# Patient Record
Sex: FEMALE | Race: WHITE | NOT HISPANIC OR LATINO | Employment: FULL TIME | URBAN - METROPOLITAN AREA
[De-identification: names, ages, dates, MRNs, and addresses within clinical notes are randomized per-mention and may not be internally consistent; named-entity substitution may affect disease eponyms.]

---

## 2021-01-07 NOTE — H&P (VIEW-ONLY)
Kelsie Wilson  [id number]  1955      Reason for visit: Postmenopausal bleeding, grade 1 endometrial cancer    Consultation:  Patient is being seen at the request of Fiona Berger CNM  PCP: No other listed by patient    History of present illness:  The patient is a 65 y.o. year old female who presents today for treatment and evaluation of the above issues.  She presented with 1 month of postmenopausal spotting.  Ultrasound showed a thickened endometrium at 14 mm.  Endometrial biopsy was performed and consistent with endometrial cancer.    She feels overall well today but is anxious about her diagnosis.  She has had minimal bleeding since her biopsy.  She denies changes in bowel and bladder habits.  She denies pelvic pain.  For new patients, [facility] intake form from 2021 was reviewed and confirmed.    OBGYN History:  She is a G 1 P 1.  She does not use HRT. She does not have a history of abnormal pap smears.      Oncologic History:  Oncology/Hematology History   Endometrial cancer (CMS/HCC)   2021 Initial Diagnosis    Endometrial cancer (CMS/HCC)     2021 Cancer Staged    Staging form: Corpus Uteri - Carcinoma And Carcinosarcoma, AJCC 8th Edition  - Clinical stage from 2021: FIGO Stage I (cT1, cN0, cM0) - Signed by Kell Monaco MD on 2021           Past Medical History:   Diagnosis Date   • Endometrial cancer (CMS/HCC)    • Heartburn    • Kidney infection        Past Surgical History:   Procedure Laterality Date   •  SECTION     • TUBAL ABDOMINAL LIGATION         MEDICATIONS: The current medication list was reviewed with the patient and updated in the EMR this date per the Medical Assistant. Medication dosages and frequencies were confirmed to be accurate.      Allergies:  is allergic to codeine and fish-derived products.    Social History:   Social History     Socioeconomic History   • Marital status:      Spouse name: Not on file   • Number of children: Not on file   •  Years of education: Not on file   • Highest education level: Not on file   Tobacco Use   • Smoking status: Never Smoker   • Smokeless tobacco: Never Used   Substance and Sexual Activity   • Alcohol use: Never     Frequency: Never   • Drug use: Never       Family History:    Family History   Problem Relation Age of Onset   • Pancreatic cancer Mother    • Hypertension Mother        Health Maintenance:    Health Maintenance   Topic Date Due   • COLONOSCOPY  1955   • ANNUAL PHYSICAL  01/25/1960   • TDAP/TD VACCINES (1 - Tdap) 01/25/1976   • ZOSTER VACCINE (1 of 2) 01/25/2007   • MAMMOGRAM  08/15/2019   • INFLUENZA VACCINE  08/01/2020   • HEPATITIS C SCREENING  01/07/2021   • PAP SMEAR  01/07/2021   • Pneumococcal Vaccine 0-64  Aged Out   • MENINGOCOCCAL VACCINE  Aged Out       Review of Systems   Constitutional: Negative for appetite change, chills, fatigue, fever and unexpected weight change.   HENT: Negative for ear discharge, ear pain, hearing loss, mouth sores, nosebleeds, postnasal drip, sinus pressure, sinus pain, sore throat, tinnitus and trouble swallowing.    Eyes: Positive for visual disturbance (wears corrective lenses). Negative for pain and itching.   Respiratory: Negative for cough, shortness of breath and wheezing.    Cardiovascular: Negative for chest pain and palpitations.   Gastrointestinal: Negative for abdominal pain, blood in stool, constipation, diarrhea, nausea and vomiting.        Heartburn     Endocrine: Negative for heat intolerance.   Genitourinary: Negative for difficulty urinating, flank pain, frequency, hematuria, urgency, vaginal bleeding and vaginal discharge.   Musculoskeletal: Negative for arthralgias, gait problem and myalgias.   Skin: Negative for color change, rash and wound.   Allergic/Immunologic: Negative for immunocompromised state.   Neurological: Negative for dizziness, seizures, syncope, weakness, numbness and headaches.   Hematological: Negative for adenopathy. Does not  "bruise/bleed easily.   Psychiatric/Behavioral: Negative for agitation, confusion, dysphoric mood and sleep disturbance. The patient is nervous/anxious.        Physical Exam    Vitals:    01/08/21 0911   BP: 172/76   Pulse: 95   Resp: 16   Temp: 96.8 °F (36 °C)   TempSrc: Temporal   SpO2: 97%   Weight: 54.5 kg (120 lb 3.2 oz)   Height: 154.9 cm (61\")   PainSc: 0-No pain       Body mass index is 22.71 kg/m².    Wt Readings from Last 3 Encounters:   01/08/21 54.5 kg (120 lb 3.2 oz)     GENERAL: Alert, well-appearing female appearing her stated age who is in no apparent distress.   HEENT: Sclera anicteric. Head normocephalic, atraumatic. Mucus membranes moist.   NECK: Trachea midline, supple, without masses.  No thyromegaly.   BREASTS: Deferred  CARDIOVASCULAR: Normal rate, regular rhythm, no murmurs, rubs, or gallops.  No peripheral edema.  RESPIRATORY: Clear to auscultation bilaterally, normal respiratory effort  BACK:  No CVA tenderness, no vertebral tenderness on palpation  GASTROINTESTINAL:  Abdomen is soft, non-tender, non-distended, no rebound or guarding, no masses, or hernias. No HSM.    SKIN:  Warm, dry, well-perfused.  All visible areas intact.  No rashes, lesions, ulcers.  PSYCHIATRIC: AO x3, with appropriate affect, normal thought processes.  NEUROLOGIC: No focal deficits.  Moves extremities well.  MUSCULOSKELETAL: Normal gait and station.   EXTREMITIES:   No cyanosis, clubbing, symmetric.  LYMPHATICS:  No cervical or inguinal adenopathy noted.     PELVIC exam:    External genitalia are free from lesion. On speculum examination, the cervix was free from lesion. On bimanual examination no mass was appreciated.  Uterus was normal in size and shape. Uterus is anteverted with adhesion to anterior abdominal wall. There is no cervical motion or uterine tenderness. No cervical mass was palpated. Parametria were smooth. Rectovaginal exam was deferred.     ECOG PS 0    PROCEDURES:  none    Diagnostic Data:  "   Outside endometrial biopsy report:  Endometrium, biopsy  Atypical glandular proliferation in a background of necrosis.  Sections show atypical glands with cribriforming, cytologic atypia, and frequent mitoses with some background endocervical microglandular hyperplasia.  IHC stains are inconclusive but may suggest possible endometrial origin.  These findings may be compatible with FIGO grade 1 endometrioid carcinoma    No Images in the past 120 days found..    No results found for: WBC, HGB, HCT, MCV, PLT, NEUTROABS, GLUCOSE, BUN, CREATININE, EGFRIFNONA, EGFRIFAFRI, NA, K, CL, CO2, MG, PHOS, CALCIUM, ALBUMIN, AST, ALT, BILITOT  No results found for:         Assessment/Plan   This is a 65 y.o. woman postmenopausal bleeding and grade 1 endometrial cancer on endometrial biopsy.  Encounter Diagnoses   Name Primary?   • Endometrial cancer (CMS/HCC) Yes   • Anxiety about health         Patient was consented for robotic assisted total laparoscopic hysterectomy, bilateral salpingo-oophorectomy, sentinel lymph node dissection, possible completion lymphadenectomy.      Risks and benefits of surgery were discussed.  This included, but was not limited to, infection and bleeding like when the skin is cut; damage to surrounding structures; and incisional complications.  Risk of DVT was addressed for major surgeries.  Standard of care efforts to minimize these risks were reviewed.  Typical hospital stay and recovery were discussed as well as post-procedure precautions.  Surgical implications of chronic illnesses on recovery and surgical outcome were reviewed.     Pain medication regimen for postoperative care was discussed.  Typical regimen and avoidance of narcotics was discussed.  Patient was educated that other factors, such as existing narcotic use, can impact postoperative pain management.      Risks and benefits of lymph node dissection were further discussed.  This included lymphocyst, hematoma, lymphedema, vascular  injury, and nerve injury.    Patient verbalized understanding of the plan including the risks and benefits.  Appropriate perioperative testing including laboratory evaluation, EKG as clinically indicated, chest x-ray as clinically indicated, and preadmission evaluation were all ordered as a part of this patient's care.    Anxiety surrounding her diagnosis was discussed. Offered resources for counseling or short term anxiety medication as she prepares for surgery.  She declined this for now.  She will call if she feels she needs resources or medication.    Pain assessment was performed today as a part of patient’s care.  For patients with pain related to surgery, gynecologic malignancy or cancer treatment, the plan is as noted in the assessment/plan.  For patients with pain not related to these issues, they are to seek any further needed care from a more appropriate provider, such as PCP.      Orders Placed This Encounter   Procedures   • SCANNED - IMAGING   • XR Chest 2 View     Standing Status:   Future     Standing Expiration Date:   1/8/2022     Order Specific Question:   Reason for Exam:     Answer:   PRE OP   • Comprehensive Metabolic Panel     Standing Status:   Future     Standing Expiration Date:   1/8/2022   • Verify NPO Status     Standing Status:   Future   • Obtain Informed Consent     Standing Status:   Future     Order Specific Question:   Informed Consent Given For     Answer:   TOTAL LAPAROSCOPIC HYSTERECTOMY BILATERAL SALPINGOOPHORECTOMY WITH DAVINCI ROBOT, SENTINEL LYMPH NODE DISSECTION, POSSIBLE COMPLETION LYMPHADENECTOMY   • Provide Instructions to Patient on NPO Status     Standing Status:   Future   • Chlorhexidine Skin Prep     Chlorhexidine Skin Prep and Instructions For All Patients Having A Procedure Requiring an Outward Incision if Not Allergic. If Allergic, Give Antibacterial Skin Wipes and Instructions. Do Not Use For Facial Cases or on Any Mucus Membranes.     Standing Status:   Future    • Instruct Patient on Coughing, Deep Breathing & Incentive Spirometry     Standing Status:   Future   • ECG 12 Lead     Standing Status:   Future     Standing Expiration Date:   1/8/2022     Order Specific Question:   Reason for Exam:     Answer:   PRE OP   • Type & Screen     Standing Status:   Future     Standing Expiration Date:   1/8/2022   • CBC & Differential     Standing Status:   Future     Standing Expiration Date:   1/8/2022     Order Specific Question:   Manual Differential     Answer:   No       FOLLOW UP: surgery    Patient was seen and examined with Dr. Botello,  resident, who performed portions of the examination and documentation for this patient's care under my direct supervision.  I agree with the above documentation and plan.    Kell Monaco MD  01/08/21  17:52 EST

## 2021-01-07 NOTE — PROGRESS NOTES
Kelsie Wilson  [id number]  1955      Reason for visit: Postmenopausal bleeding, grade 1 endometrial cancer    Consultation:  Patient is being seen at the request of Fiona Berger CNM  PCP: No other listed by patient    History of present illness:  The patient is a 65 y.o. year old female who presents today for treatment and evaluation of the above issues.  She presented with 1 month of postmenopausal spotting.  Ultrasound showed a thickened endometrium at 14 mm.  Endometrial biopsy was performed and consistent with endometrial cancer.    She feels overall well today but is anxious about her diagnosis.  She has had minimal bleeding since her biopsy.  She denies changes in bowel and bladder habits.  She denies pelvic pain.  For new patients, On license of [facility] intake form from 2021 was reviewed and confirmed.    OBGYN History:  She is a G 1 P 1.  She does not use HRT. She does not have a history of abnormal pap smears.      Oncologic History:  Oncology/Hematology History   Endometrial cancer (CMS/HCC)   2021 Initial Diagnosis    Endometrial cancer (CMS/HCC)     2021 Cancer Staged    Staging form: Corpus Uteri - Carcinoma And Carcinosarcoma, AJCC 8th Edition  - Clinical stage from 2021: FIGO Stage I (cT1, cN0, cM0) - Signed by Kell Monaco MD on 2021           Past Medical History:   Diagnosis Date   • Endometrial cancer (CMS/HCC)    • Heartburn    • Kidney infection        Past Surgical History:   Procedure Laterality Date   •  SECTION     • TUBAL ABDOMINAL LIGATION         MEDICATIONS: The current medication list was reviewed with the patient and updated in the EMR this date per the Medical Assistant. Medication dosages and frequencies were confirmed to be accurate.      Allergies:  is allergic to codeine and fish-derived products.    Social History:   Social History     Socioeconomic History   • Marital status:      Spouse name: Not on file   • Number of children: Not on file   •  Years of education: Not on file   • Highest education level: Not on file   Tobacco Use   • Smoking status: Never Smoker   • Smokeless tobacco: Never Used   Substance and Sexual Activity   • Alcohol use: Never     Frequency: Never   • Drug use: Never       Family History:    Family History   Problem Relation Age of Onset   • Pancreatic cancer Mother    • Hypertension Mother        Health Maintenance:    Health Maintenance   Topic Date Due   • COLONOSCOPY  1955   • ANNUAL PHYSICAL  01/25/1960   • TDAP/TD VACCINES (1 - Tdap) 01/25/1976   • ZOSTER VACCINE (1 of 2) 01/25/2007   • MAMMOGRAM  08/15/2019   • INFLUENZA VACCINE  08/01/2020   • HEPATITIS C SCREENING  01/07/2021   • PAP SMEAR  01/07/2021   • Pneumococcal Vaccine 0-64  Aged Out   • MENINGOCOCCAL VACCINE  Aged Out       Review of Systems   Constitutional: Negative for appetite change, chills, fatigue, fever and unexpected weight change.   HENT: Negative for ear discharge, ear pain, hearing loss, mouth sores, nosebleeds, postnasal drip, sinus pressure, sinus pain, sore throat, tinnitus and trouble swallowing.    Eyes: Positive for visual disturbance (wears corrective lenses). Negative for pain and itching.   Respiratory: Negative for cough, shortness of breath and wheezing.    Cardiovascular: Negative for chest pain and palpitations.   Gastrointestinal: Negative for abdominal pain, blood in stool, constipation, diarrhea, nausea and vomiting.        Heartburn     Endocrine: Negative for heat intolerance.   Genitourinary: Negative for difficulty urinating, flank pain, frequency, hematuria, urgency, vaginal bleeding and vaginal discharge.   Musculoskeletal: Negative for arthralgias, gait problem and myalgias.   Skin: Negative for color change, rash and wound.   Allergic/Immunologic: Negative for immunocompromised state.   Neurological: Negative for dizziness, seizures, syncope, weakness, numbness and headaches.   Hematological: Negative for adenopathy. Does not  "bruise/bleed easily.   Psychiatric/Behavioral: Negative for agitation, confusion, dysphoric mood and sleep disturbance. The patient is nervous/anxious.        Physical Exam    Vitals:    01/08/21 0911   BP: 172/76   Pulse: 95   Resp: 16   Temp: 96.8 °F (36 °C)   TempSrc: Temporal   SpO2: 97%   Weight: 54.5 kg (120 lb 3.2 oz)   Height: 154.9 cm (61\")   PainSc: 0-No pain       Body mass index is 22.71 kg/m².    Wt Readings from Last 3 Encounters:   01/08/21 54.5 kg (120 lb 3.2 oz)     GENERAL: Alert, well-appearing female appearing her stated age who is in no apparent distress.   HEENT: Sclera anicteric. Head normocephalic, atraumatic. Mucus membranes moist.   NECK: Trachea midline, supple, without masses.  No thyromegaly.   BREASTS: Deferred  CARDIOVASCULAR: Normal rate, regular rhythm, no murmurs, rubs, or gallops.  No peripheral edema.  RESPIRATORY: Clear to auscultation bilaterally, normal respiratory effort  BACK:  No CVA tenderness, no vertebral tenderness on palpation  GASTROINTESTINAL:  Abdomen is soft, non-tender, non-distended, no rebound or guarding, no masses, or hernias. No HSM.    SKIN:  Warm, dry, well-perfused.  All visible areas intact.  No rashes, lesions, ulcers.  PSYCHIATRIC: AO x3, with appropriate affect, normal thought processes.  NEUROLOGIC: No focal deficits.  Moves extremities well.  MUSCULOSKELETAL: Normal gait and station.   EXTREMITIES:   No cyanosis, clubbing, symmetric.  LYMPHATICS:  No cervical or inguinal adenopathy noted.     PELVIC exam:    External genitalia are free from lesion. On speculum examination, the cervix was free from lesion. On bimanual examination no mass was appreciated.  Uterus was normal in size and shape. Uterus is anteverted with adhesion to anterior abdominal wall. There is no cervical motion or uterine tenderness. No cervical mass was palpated. Parametria were smooth. Rectovaginal exam was deferred.     ECOG PS 0    PROCEDURES:  none    Diagnostic Data:  "   Outside endometrial biopsy report:  Endometrium, biopsy  Atypical glandular proliferation in a background of necrosis.  Sections show atypical glands with cribriforming, cytologic atypia, and frequent mitoses with some background endocervical microglandular hyperplasia.  IHC stains are inconclusive but may suggest possible endometrial origin.  These findings may be compatible with FIGO grade 1 endometrioid carcinoma    No Images in the past 120 days found..    No results found for: WBC, HGB, HCT, MCV, PLT, NEUTROABS, GLUCOSE, BUN, CREATININE, EGFRIFNONA, EGFRIFAFRI, NA, K, CL, CO2, MG, PHOS, CALCIUM, ALBUMIN, AST, ALT, BILITOT  No results found for:         Assessment/Plan   This is a 65 y.o. woman postmenopausal bleeding and grade 1 endometrial cancer on endometrial biopsy.  Encounter Diagnoses   Name Primary?   • Endometrial cancer (CMS/HCC) Yes   • Anxiety about health         Patient was consented for robotic assisted total laparoscopic hysterectomy, bilateral salpingo-oophorectomy, sentinel lymph node dissection, possible completion lymphadenectomy.      Risks and benefits of surgery were discussed.  This included, but was not limited to, infection and bleeding like when the skin is cut; damage to surrounding structures; and incisional complications.  Risk of DVT was addressed for major surgeries.  Standard of care efforts to minimize these risks were reviewed.  Typical hospital stay and recovery were discussed as well as post-procedure precautions.  Surgical implications of chronic illnesses on recovery and surgical outcome were reviewed.     Pain medication regimen for postoperative care was discussed.  Typical regimen and avoidance of narcotics was discussed.  Patient was educated that other factors, such as existing narcotic use, can impact postoperative pain management.      Risks and benefits of lymph node dissection were further discussed.  This included lymphocyst, hematoma, lymphedema, vascular  injury, and nerve injury.    Patient verbalized understanding of the plan including the risks and benefits.  Appropriate perioperative testing including laboratory evaluation, EKG as clinically indicated, chest x-ray as clinically indicated, and preadmission evaluation were all ordered as a part of this patient's care.    Anxiety surrounding her diagnosis was discussed. Offered resources for counseling or short term anxiety medication as she prepares for surgery.  She declined this for now.  She will call if she feels she needs resources or medication.    Pain assessment was performed today as a part of patient’s care.  For patients with pain related to surgery, gynecologic malignancy or cancer treatment, the plan is as noted in the assessment/plan.  For patients with pain not related to these issues, they are to seek any further needed care from a more appropriate provider, such as PCP.      Orders Placed This Encounter   Procedures   • SCANNED - IMAGING   • XR Chest 2 View     Standing Status:   Future     Standing Expiration Date:   1/8/2022     Order Specific Question:   Reason for Exam:     Answer:   PRE OP   • Comprehensive Metabolic Panel     Standing Status:   Future     Standing Expiration Date:   1/8/2022   • Verify NPO Status     Standing Status:   Future   • Obtain Informed Consent     Standing Status:   Future     Order Specific Question:   Informed Consent Given For     Answer:   TOTAL LAPAROSCOPIC HYSTERECTOMY BILATERAL SALPINGOOPHORECTOMY WITH DAVINCI ROBOT, SENTINEL LYMPH NODE DISSECTION, POSSIBLE COMPLETION LYMPHADENECTOMY   • Provide Instructions to Patient on NPO Status     Standing Status:   Future   • Chlorhexidine Skin Prep     Chlorhexidine Skin Prep and Instructions For All Patients Having A Procedure Requiring an Outward Incision if Not Allergic. If Allergic, Give Antibacterial Skin Wipes and Instructions. Do Not Use For Facial Cases or on Any Mucus Membranes.     Standing Status:   Future    • Instruct Patient on Coughing, Deep Breathing & Incentive Spirometry     Standing Status:   Future   • ECG 12 Lead     Standing Status:   Future     Standing Expiration Date:   1/8/2022     Order Specific Question:   Reason for Exam:     Answer:   PRE OP   • Type & Screen     Standing Status:   Future     Standing Expiration Date:   1/8/2022   • CBC & Differential     Standing Status:   Future     Standing Expiration Date:   1/8/2022     Order Specific Question:   Manual Differential     Answer:   No       FOLLOW UP: surgery    Patient was seen and examined with Dr. Botello,  resident, who performed portions of the examination and documentation for this patient's care under my direct supervision.  I agree with the above documentation and plan.    Kell Monaco MD  01/08/21  17:52 EST

## 2021-01-08 ENCOUNTER — PATIENT EDUCATION (SURGERY INSTRUCTIONS) (OUTPATIENT)
Dept: GYNECOLOGIC ONCOLOGY | Facility: CLINIC | Age: 66
End: 2021-01-08

## 2021-01-08 ENCOUNTER — OFFICE VISIT (OUTPATIENT)
Dept: GYNECOLOGIC ONCOLOGY | Facility: CLINIC | Age: 66
End: 2021-01-08

## 2021-01-08 VITALS
HEART RATE: 95 BPM | TEMPERATURE: 96.8 F | RESPIRATION RATE: 16 BRPM | BODY MASS INDEX: 22.69 KG/M2 | OXYGEN SATURATION: 97 % | HEIGHT: 61 IN | WEIGHT: 120.2 LBS | DIASTOLIC BLOOD PRESSURE: 76 MMHG | SYSTOLIC BLOOD PRESSURE: 172 MMHG

## 2021-01-08 DIAGNOSIS — C54.1 ENDOMETRIAL CANCER (HCC): Primary | ICD-10-CM

## 2021-01-08 DIAGNOSIS — F41.8 ANXIETY ABOUT HEALTH: ICD-10-CM

## 2021-01-08 PROBLEM — R45.89 ANXIETY ABOUT HEALTH: Status: ACTIVE | Noted: 2021-01-08

## 2021-01-08 PROCEDURE — 99205 OFFICE O/P NEW HI 60 MIN: CPT | Performed by: OBSTETRICS & GYNECOLOGY

## 2021-01-08 RX ORDER — CELECOXIB 100 MG/1
200 CAPSULE ORAL ONCE
Status: CANCELLED | OUTPATIENT
Start: 2021-01-08 | End: 2021-01-08

## 2021-01-08 RX ORDER — ACETAMINOPHEN 325 MG/1
650 TABLET ORAL ONCE
Status: CANCELLED | OUTPATIENT
Start: 2021-01-08 | End: 2021-01-08

## 2021-01-08 NOTE — PATIENT INSTRUCTIONS
Laparoscopic Surgery Instructions            Kelsie Wilson  [id number]  1955      SURGEON:  Kell Monaco MD    Surgery Coordinator: Tiffany   If you have any questions before or after surgery please call.  [phone]       Appointment    1. You have pre-admission testing (PAT) appointment on 01/13/2021 at 0900 AM. You will need to be at hospital registration 10 minutes before that time. The registration department is located in the long hallway between the [facility] and [address] [place] [address].If your PAT appointment is on Sunday, please enter through the Emergency Department.     2.  Your surgery has been scheduled on 01/14/2021  You will need to be at the [address] [place] [address] second floor surgery registration on that day at 09:45 AM.    The Day(s) Before Surgery     ·  Nothing by mouth after midnight on 01/13/2021    · Plan to have someone take you home from the hospital.    · Do not use any products that contain nicotine or tobacco, such as cigarettes and e-cigarettes. These can delay healing after surgery. If you need help quitting, ask your health care provider.    ·  Do not take vitamins or aspirin one week before surgery ( if applicable).  On the morning of your surgery, you may take you prescription medications with a sip of water, unless told otherwise by your provider.  Bring them with you to the hospital (Diabetic patient should bring insulin if instructed by the managing physician). If you are taking a blood thinner ( Eliquis, Coumadin, Xarelto, Lovenox, Asprin, Heparin, etc.) please have the provider that manages this instruct you on when to stop taking prior to surgery.     · If you are feeling sick, have a fever or cough and have seen your PCP let our office know 48 hours prior to surgery. It may be subject to rescheduling.            What can I expect after the procedure?    A normal length of stay for laparoscopic procedures can be same day or up to 23 hours.     After the procedure, it is common to  have:  · Pain.  · Soreness and numbness in your incision areas.  · Vaginal bleeding and discharge up to 6 weeks after surgery.  · Constipation.  · Temporary change in bladder function.  · Feelings of sadness or other emotions.  · Small amounts of clear drainage from incisions  · If you are discharged with an abdominal binder, this is to be used as needed for incisional comfort. You may choose to discontinue use at any time.      Follow these instructions at home:  Medicines    · Please take any medications that have been prescribed after your surgery, you may take over the counter Tylenol and Ibuprofen, unless told otherwise by your provider.   · Please take your stool softener as prescribed. If you do not have a bowel movement within 24 hours following surgery try a laxative (milk of magnesia) or you may take Celina-Lax.    Activity    · Return to your normal activities as told by your health care provider.   · You may be told to take short walks every day and go farther each time.  · Do not lift anything that is heavier than 20 lbs.      General instructions    · Do not put anything in your vagina for 6 weeks after your surgery or as told by your health care provider. This includes tampons and douches.  · Do not have sex until your health care provider says you can.  · Do not take baths, swim, or use a hot tub until your health care provider approves.  · Drink enough fluid to keep your urine clear or pale yellow.  · Do not drive for 24 hours if you were given a sedative.  · Do not drive while taking Narcotic Pain medication ( oxycodone, hydrocodone, etc.).   · Keep all follow-up visits as told by your health care provider. This is important. You will have a post op appointment typically 3 weeks after surgery.  · Make sure you are urinating on a scheduled basis, for example every 2 hours. This will help retrain your bladder after surgery and prevent urinary tract infections.     Contact a health care provider  if:    · Your pain medicine is not helping.  · You have a fever over 101.0  · You have redness, swelling, or pain at your incision site.  · You continue to have difficulty urinating.  · You have not had a bowel movement 2-3 days after surgery, experience nausea and vomiting, are unable to pass gas.   · Large volumes of drainage or blood from incisions, requiring multiple guaze changes.     Get help right away if:    · You have severe abdominal or back pain that is not controlled with medication.  · You have heavy bleeding from your vagina that saturates a maxi pad within 1-2 hours. Note- vaginal bleeding and spotting is normal up to 6 weeks from a hysterectomy, Heavy Bleeding is not.     If you experience a medical emergency call 911 or have someone drive you to your nearest emergency department.

## 2021-01-13 ENCOUNTER — HOSPITAL ENCOUNTER (OUTPATIENT)
Dept: GENERAL RADIOLOGY | Facility: HOSPITAL | Age: 66
Discharge: HOME | End: 2021-01-13

## 2021-01-13 ENCOUNTER — ANESTHESIA EVENT (OUTPATIENT)
Dept: PERIOP | Facility: HOSPITAL | Age: 66
End: 2021-01-13

## 2021-01-13 ENCOUNTER — APPOINTMENT (OUTPATIENT)
Dept: PREADMISSION TESTING | Facility: HOSPITAL | Age: 66
End: 2021-01-13

## 2021-01-13 VITALS — BODY MASS INDEX: 22.28 KG/M2 | HEIGHT: 61 IN | WEIGHT: 118 LBS

## 2021-01-13 DIAGNOSIS — C54.1 ENDOMETRIAL CANCER (HCC): ICD-10-CM

## 2021-01-13 LAB
ABO GROUP BLD: NORMAL
ALBUMIN SERPL-MCNC: 4.3 G/DL (ref 3.5–5.2)
ALBUMIN/GLOB SERPL: 2 G/DL
ALP SERPL-CCNC: 85 U/L (ref 39–117)
ALT SERPL W P-5'-P-CCNC: 14 U/L (ref 1–33)
ANION GAP SERPL CALCULATED.3IONS-SCNC: 10 MMOL/L (ref 5–15)
AST SERPL-CCNC: 18 U/L (ref 1–32)
BASOPHILS # BLD AUTO: 0.02 10*3/MM3 (ref 0–0.2)
BASOPHILS NFR BLD AUTO: 0.3 % (ref 0–1.5)
BILIRUB SERPL-MCNC: 0.5 MG/DL (ref 0–1.2)
BLD GP AB SCN SERPL QL: NEGATIVE
BUN SERPL-MCNC: 10 MG/DL (ref 8–23)
BUN/CREAT SERPL: 14.3 (ref 7–25)
CALCIUM SPEC-SCNC: 9.7 MG/DL (ref 8.6–10.5)
CHLORIDE SERPL-SCNC: 104 MMOL/L (ref 98–107)
CO2 SERPL-SCNC: 27 MMOL/L (ref 22–29)
CREAT SERPL-MCNC: 0.7 MG/DL (ref 0.57–1)
DEPRECATED RDW RBC AUTO: 42.7 FL (ref 37–54)
EOSINOPHIL # BLD AUTO: 0.04 10*3/MM3 (ref 0–0.4)
EOSINOPHIL NFR BLD AUTO: 0.6 % (ref 0.3–6.2)
ERYTHROCYTE [DISTWIDTH] IN BLOOD BY AUTOMATED COUNT: 12.2 % (ref 12.3–15.4)
GFR SERPL CREATININE-BSD FRML MDRD: 85 ML/MIN/1.73
GLOBULIN UR ELPH-MCNC: 2.2 GM/DL
GLUCOSE SERPL-MCNC: 104 MG/DL (ref 65–99)
HBA1C MFR BLD: 5.3 % (ref 4.8–5.6)
HCT VFR BLD AUTO: 42.8 % (ref 34–46.6)
HGB BLD-MCNC: 14 G/DL (ref 12–15.9)
IMM GRANULOCYTES # BLD AUTO: 0.01 10*3/MM3 (ref 0–0.05)
IMM GRANULOCYTES NFR BLD AUTO: 0.2 % (ref 0–0.5)
LYMPHOCYTES # BLD AUTO: 1.39 10*3/MM3 (ref 0.7–3.1)
LYMPHOCYTES NFR BLD AUTO: 21.1 % (ref 19.6–45.3)
MCH RBC QN AUTO: 31.2 PG (ref 26.6–33)
MCHC RBC AUTO-ENTMCNC: 32.7 G/DL (ref 31.5–35.7)
MCV RBC AUTO: 95.3 FL (ref 79–97)
MONOCYTES # BLD AUTO: 0.31 10*3/MM3 (ref 0.1–0.9)
MONOCYTES NFR BLD AUTO: 4.7 % (ref 5–12)
NEUTROPHILS NFR BLD AUTO: 4.83 10*3/MM3 (ref 1.7–7)
NEUTROPHILS NFR BLD AUTO: 73.1 % (ref 42.7–76)
NRBC BLD AUTO-RTO: 0 /100 WBC (ref 0–0.2)
PLATELET # BLD AUTO: 263 10*3/MM3 (ref 140–450)
PMV BLD AUTO: 8.1 FL (ref 6–12)
POTASSIUM SERPL-SCNC: 4.2 MMOL/L (ref 3.5–5.2)
PROT SERPL-MCNC: 6.5 G/DL (ref 6–8.5)
QT INTERVAL: 380 MS
QTC INTERVAL: 472 MS
RBC # BLD AUTO: 4.49 10*6/MM3 (ref 3.77–5.28)
RH BLD: POSITIVE
SARS-COV-2 RNA RESP QL NAA+PROBE: NOT DETECTED
SODIUM SERPL-SCNC: 141 MMOL/L (ref 136–145)
T&S EXPIRATION DATE: NORMAL
WBC # BLD AUTO: 6.6 10*3/MM3 (ref 3.4–10.8)

## 2021-01-13 PROCEDURE — 86900 BLOOD TYPING SEROLOGIC ABO: CPT

## 2021-01-13 PROCEDURE — 93010 ELECTROCARDIOGRAM REPORT: CPT | Performed by: INTERNAL MEDICINE

## 2021-01-13 PROCEDURE — 36415 COLL VENOUS BLD VENIPUNCTURE: CPT

## 2021-01-13 PROCEDURE — 93005 ELECTROCARDIOGRAM TRACING: CPT

## 2021-01-13 PROCEDURE — 85025 COMPLETE CBC W/AUTO DIFF WBC: CPT

## 2021-01-13 PROCEDURE — 83036 HEMOGLOBIN GLYCOSYLATED A1C: CPT

## 2021-01-13 PROCEDURE — 71046 X-RAY EXAM CHEST 2 VIEWS: CPT

## 2021-01-13 PROCEDURE — 86901 BLOOD TYPING SEROLOGIC RH(D): CPT

## 2021-01-13 PROCEDURE — C9803 HOPD COVID-19 SPEC COLLECT: HCPCS

## 2021-01-13 PROCEDURE — U0004 COV-19 TEST NON-CDC HGH THRU: HCPCS

## 2021-01-13 PROCEDURE — 86850 RBC ANTIBODY SCREEN: CPT

## 2021-01-13 PROCEDURE — 80053 COMPREHEN METABOLIC PANEL: CPT

## 2021-01-13 RX ORDER — FAMOTIDINE 10 MG/ML
20 INJECTION, SOLUTION INTRAVENOUS ONCE
Status: CANCELLED | OUTPATIENT
Start: 2021-01-13 | End: 2021-01-13

## 2021-01-13 NOTE — PAT
Patient to apply Chlorhexadine wipes  to surgical area (as instructed) the night before procedure and the AM of procedure. Wipes provided.    Blood bank bracelet applied to patient during Pre Admission Testing visit.  Patient instructed not to remove from arm until after procedure and they are discharged from the hospital.  Explained to patient that they may shower and get the bracelet wet, but not to immerse under water for longer periods (bathing, swimming, hand dishwashing, etc).  Patient verbalized understanding.    Pt sent to CXR after PAT visit.

## 2021-01-14 ENCOUNTER — ANESTHESIA (OUTPATIENT)
Dept: PERIOP | Facility: HOSPITAL | Age: 66
End: 2021-01-14

## 2021-01-14 ENCOUNTER — HOSPITAL ENCOUNTER (OUTPATIENT)
Facility: HOSPITAL | Age: 66
Discharge: HOME | End: 2021-01-14
Attending: OBSTETRICS & GYNECOLOGY | Admitting: OBSTETRICS & GYNECOLOGY

## 2021-01-14 VITALS
DIASTOLIC BLOOD PRESSURE: 80 MMHG | HEIGHT: 61 IN | BODY MASS INDEX: 22.28 KG/M2 | OXYGEN SATURATION: 98 % | RESPIRATION RATE: 16 BRPM | WEIGHT: 118 LBS | SYSTOLIC BLOOD PRESSURE: 159 MMHG | TEMPERATURE: 98 F | HEART RATE: 96 BPM

## 2021-01-14 DIAGNOSIS — C54.1 ENDOMETRIAL CANCER (HCC): Primary | ICD-10-CM

## 2021-01-14 LAB
ABO GROUP BLD: NORMAL
RH BLD: POSITIVE

## 2021-01-14 PROCEDURE — 38900 IO MAP OF SENT LYMPH NODE: CPT | Performed by: OBSTETRICS & GYNECOLOGY

## 2021-01-14 PROCEDURE — 86900 BLOOD TYPING SEROLOGIC ABO: CPT

## 2021-01-14 PROCEDURE — 25010000002 NEOSTIGMINE 10 MG/10ML SOLUTION: Performed by: NURSE ANESTHETIST, CERTIFIED REGISTERED

## 2021-01-14 PROCEDURE — 25010000002 ONDANSETRON PER 1 MG: Performed by: NURSE ANESTHETIST, CERTIFIED REGISTERED

## 2021-01-14 PROCEDURE — 25010000002 FENTANYL CITRATE (PF) 100 MCG/2ML SOLUTION: Performed by: NURSE ANESTHETIST, CERTIFIED REGISTERED

## 2021-01-14 PROCEDURE — 38572 LAPAROSCOPY LYMPHADENECTOMY: CPT | Performed by: OBSTETRICS & GYNECOLOGY

## 2021-01-14 PROCEDURE — 25010000003 CEFAZOLIN IN DEXTROSE 2-4 GM/100ML-% SOLUTION: Performed by: OBSTETRICS & GYNECOLOGY

## 2021-01-14 PROCEDURE — 88331 PATH CONSLTJ SURG 1 BLK 1SPC: CPT | Performed by: PATHOLOGY

## 2021-01-14 PROCEDURE — G0378 HOSPITAL OBSERVATION PER HR: HCPCS

## 2021-01-14 PROCEDURE — 58571 TLH W/T/O 250 G OR LESS: CPT | Performed by: OBSTETRICS & GYNECOLOGY

## 2021-01-14 PROCEDURE — 88309 TISSUE EXAM BY PATHOLOGIST: CPT | Performed by: OBSTETRICS & GYNECOLOGY

## 2021-01-14 PROCEDURE — 25010000002 DEXAMETHASONE PER 1 MG: Performed by: NURSE ANESTHETIST, CERTIFIED REGISTERED

## 2021-01-14 PROCEDURE — 88342 IMHCHEM/IMCYTCHM 1ST ANTB: CPT | Performed by: OBSTETRICS & GYNECOLOGY

## 2021-01-14 PROCEDURE — 25010000002 HYDROMORPHONE PER 4 MG: Performed by: NURSE ANESTHETIST, CERTIFIED REGISTERED

## 2021-01-14 PROCEDURE — 86901 BLOOD TYPING SEROLOGIC RH(D): CPT

## 2021-01-14 PROCEDURE — 25010000002 PROPOFOL 10 MG/ML EMULSION: Performed by: NURSE ANESTHETIST, CERTIFIED REGISTERED

## 2021-01-14 PROCEDURE — 88307 TISSUE EXAM BY PATHOLOGIST: CPT | Performed by: OBSTETRICS & GYNECOLOGY

## 2021-01-14 RX ORDER — INDOCYANINE GREEN AND WATER 25 MG
KIT INJECTION AS NEEDED
Status: DISCONTINUED | OUTPATIENT
Start: 2021-01-14 | End: 2021-01-14 | Stop reason: HOSPADM

## 2021-01-14 RX ORDER — MAGNESIUM HYDROXIDE 1200 MG/15ML
LIQUID ORAL AS NEEDED
Status: DISCONTINUED | OUTPATIENT
Start: 2021-01-14 | End: 2021-01-14 | Stop reason: HOSPADM

## 2021-01-14 RX ORDER — CELECOXIB 200 MG/1
200 CAPSULE ORAL ONCE
Status: COMPLETED | OUTPATIENT
Start: 2021-01-14 | End: 2021-01-14

## 2021-01-14 RX ORDER — ONDANSETRON 2 MG/ML
INJECTION INTRAMUSCULAR; INTRAVENOUS AS NEEDED
Status: DISCONTINUED | OUTPATIENT
Start: 2021-01-14 | End: 2021-01-14 | Stop reason: SURG

## 2021-01-14 RX ORDER — PROPOFOL 10 MG/ML
VIAL (ML) INTRAVENOUS CONTINUOUS PRN
Status: DISCONTINUED | OUTPATIENT
Start: 2021-01-14 | End: 2021-01-14 | Stop reason: SURG

## 2021-01-14 RX ORDER — ACETAMINOPHEN 325 MG/1
650 TABLET ORAL EVERY 6 HOURS PRN
Qty: 60 TABLET | Refills: 0 | Status: SHIPPED | OUTPATIENT
Start: 2021-01-14 | End: 2021-02-03

## 2021-01-14 RX ORDER — FENTANYL CITRATE 50 UG/ML
50 INJECTION, SOLUTION INTRAMUSCULAR; INTRAVENOUS
Status: DISCONTINUED | OUTPATIENT
Start: 2021-01-14 | End: 2021-01-14 | Stop reason: HOSPADM

## 2021-01-14 RX ORDER — LIDOCAINE HYDROCHLORIDE 10 MG/ML
0.5 INJECTION, SOLUTION EPIDURAL; INFILTRATION; INTRACAUDAL; PERINEURAL ONCE AS NEEDED
Status: COMPLETED | OUTPATIENT
Start: 2021-01-14 | End: 2021-01-14

## 2021-01-14 RX ORDER — BUPIVACAINE HYDROCHLORIDE AND EPINEPHRINE 5; 5 MG/ML; UG/ML
INJECTION, SOLUTION PERINEURAL AS NEEDED
Status: DISCONTINUED | OUTPATIENT
Start: 2021-01-14 | End: 2021-01-14 | Stop reason: HOSPADM

## 2021-01-14 RX ORDER — OXYCODONE HYDROCHLORIDE 5 MG/1
5 TABLET ORAL EVERY 4 HOURS PRN
Status: DISCONTINUED | OUTPATIENT
Start: 2021-01-14 | End: 2021-01-14 | Stop reason: HOSPADM

## 2021-01-14 RX ORDER — ACETAMINOPHEN 325 MG/1
650 TABLET ORAL ONCE
Status: COMPLETED | OUTPATIENT
Start: 2021-01-14 | End: 2021-01-14

## 2021-01-14 RX ORDER — SIMETHICONE 80 MG
80 TABLET,CHEWABLE ORAL ONCE
Status: COMPLETED | OUTPATIENT
Start: 2021-01-14 | End: 2021-01-14

## 2021-01-14 RX ORDER — ACETAMINOPHEN 325 MG/1
650 TABLET ORAL EVERY 6 HOURS PRN
Status: DISCONTINUED | OUTPATIENT
Start: 2021-01-14 | End: 2021-01-14 | Stop reason: HOSPADM

## 2021-01-14 RX ORDER — SODIUM CHLORIDE, SODIUM LACTATE, POTASSIUM CHLORIDE, CALCIUM CHLORIDE 600; 310; 30; 20 MG/100ML; MG/100ML; MG/100ML; MG/100ML
9 INJECTION, SOLUTION INTRAVENOUS CONTINUOUS
Status: DISCONTINUED | OUTPATIENT
Start: 2021-01-14 | End: 2021-01-14 | Stop reason: HOSPADM

## 2021-01-14 RX ORDER — ONDANSETRON 4 MG/1
4 TABLET, FILM COATED ORAL EVERY 6 HOURS PRN
Status: DISCONTINUED | OUTPATIENT
Start: 2021-01-14 | End: 2021-01-14 | Stop reason: HOSPADM

## 2021-01-14 RX ORDER — SODIUM CHLORIDE 0.9 % (FLUSH) 0.9 %
10 SYRINGE (ML) INJECTION EVERY 12 HOURS SCHEDULED
Status: DISCONTINUED | OUTPATIENT
Start: 2021-01-14 | End: 2021-01-14 | Stop reason: HOSPADM

## 2021-01-14 RX ORDER — ONDANSETRON 4 MG/1
4 TABLET, FILM COATED ORAL EVERY 6 HOURS PRN
Qty: 10 TABLET | Refills: 0 | Status: SHIPPED | OUTPATIENT
Start: 2021-01-14 | End: 2021-02-03

## 2021-01-14 RX ORDER — ONDANSETRON 2 MG/ML
4 INJECTION INTRAMUSCULAR; INTRAVENOUS ONCE AS NEEDED
Status: COMPLETED | OUTPATIENT
Start: 2021-01-14 | End: 2021-01-14

## 2021-01-14 RX ORDER — DOCUSATE SODIUM 100 MG/1
100 CAPSULE, LIQUID FILLED ORAL 2 TIMES DAILY
Status: DISCONTINUED | OUTPATIENT
Start: 2021-01-14 | End: 2021-01-14 | Stop reason: HOSPADM

## 2021-01-14 RX ORDER — FENTANYL CITRATE 50 UG/ML
INJECTION, SOLUTION INTRAMUSCULAR; INTRAVENOUS AS NEEDED
Status: DISCONTINUED | OUTPATIENT
Start: 2021-01-14 | End: 2021-01-14 | Stop reason: SURG

## 2021-01-14 RX ORDER — FAMOTIDINE 20 MG/1
20 TABLET, FILM COATED ORAL ONCE
Status: COMPLETED | OUTPATIENT
Start: 2021-01-14 | End: 2021-01-14

## 2021-01-14 RX ORDER — PSEUDOEPHEDRINE HCL 30 MG
100 TABLET ORAL 2 TIMES DAILY
Qty: 60 CAPSULE | Refills: 0 | Status: SHIPPED | OUTPATIENT
Start: 2021-01-14 | End: 2021-02-03

## 2021-01-14 RX ORDER — PROPOFOL 10 MG/ML
VIAL (ML) INTRAVENOUS AS NEEDED
Status: DISCONTINUED | OUTPATIENT
Start: 2021-01-14 | End: 2021-01-14 | Stop reason: SURG

## 2021-01-14 RX ORDER — DEXAMETHASONE SODIUM PHOSPHATE 4 MG/ML
INJECTION, SOLUTION INTRA-ARTICULAR; INTRALESIONAL; INTRAMUSCULAR; INTRAVENOUS; SOFT TISSUE AS NEEDED
Status: DISCONTINUED | OUTPATIENT
Start: 2021-01-14 | End: 2021-01-14 | Stop reason: SURG

## 2021-01-14 RX ORDER — ROCURONIUM BROMIDE 10 MG/ML
INJECTION, SOLUTION INTRAVENOUS AS NEEDED
Status: DISCONTINUED | OUTPATIENT
Start: 2021-01-14 | End: 2021-01-14 | Stop reason: SURG

## 2021-01-14 RX ORDER — IBUPROFEN 600 MG/1
600 TABLET ORAL EVERY 6 HOURS PRN
Status: DISCONTINUED | OUTPATIENT
Start: 2021-01-14 | End: 2021-01-14 | Stop reason: HOSPADM

## 2021-01-14 RX ORDER — OXYCODONE HYDROCHLORIDE 5 MG/1
5 TABLET ORAL EVERY 4 HOURS PRN
Qty: 10 TABLET | Refills: 0 | Status: SHIPPED | OUTPATIENT
Start: 2021-01-14 | End: 2021-01-21

## 2021-01-14 RX ORDER — LIDOCAINE HYDROCHLORIDE 10 MG/ML
INJECTION, SOLUTION EPIDURAL; INFILTRATION; INTRACAUDAL; PERINEURAL AS NEEDED
Status: DISCONTINUED | OUTPATIENT
Start: 2021-01-14 | End: 2021-01-14 | Stop reason: SURG

## 2021-01-14 RX ORDER — NEOSTIGMINE METHYLSULFATE 1 MG/ML
INJECTION, SOLUTION INTRAVENOUS AS NEEDED
Status: DISCONTINUED | OUTPATIENT
Start: 2021-01-14 | End: 2021-01-14 | Stop reason: SURG

## 2021-01-14 RX ORDER — SODIUM CHLORIDE 9 MG/ML
INJECTION, SOLUTION INTRAVENOUS AS NEEDED
Status: DISCONTINUED | OUTPATIENT
Start: 2021-01-14 | End: 2021-01-14 | Stop reason: HOSPADM

## 2021-01-14 RX ORDER — SODIUM CHLORIDE 0.9 % (FLUSH) 0.9 %
10 SYRINGE (ML) INJECTION AS NEEDED
Status: DISCONTINUED | OUTPATIENT
Start: 2021-01-14 | End: 2021-01-14 | Stop reason: HOSPADM

## 2021-01-14 RX ORDER — ESMOLOL HYDROCHLORIDE 10 MG/ML
INJECTION INTRAVENOUS AS NEEDED
Status: DISCONTINUED | OUTPATIENT
Start: 2021-01-14 | End: 2021-01-14 | Stop reason: SURG

## 2021-01-14 RX ORDER — CEFAZOLIN SODIUM 2 G/100ML
2 INJECTION, SOLUTION INTRAVENOUS ONCE
Status: COMPLETED | OUTPATIENT
Start: 2021-01-14 | End: 2021-01-14

## 2021-01-14 RX ORDER — GLYCOPYRROLATE 0.2 MG/ML
INJECTION INTRAMUSCULAR; INTRAVENOUS AS NEEDED
Status: DISCONTINUED | OUTPATIENT
Start: 2021-01-14 | End: 2021-01-14 | Stop reason: SURG

## 2021-01-14 RX ORDER — HYDROMORPHONE HYDROCHLORIDE 1 MG/ML
0.5 INJECTION, SOLUTION INTRAMUSCULAR; INTRAVENOUS; SUBCUTANEOUS
Status: DISCONTINUED | OUTPATIENT
Start: 2021-01-14 | End: 2021-01-14 | Stop reason: HOSPADM

## 2021-01-14 RX ORDER — IBUPROFEN 600 MG/1
600 TABLET ORAL EVERY 6 HOURS PRN
Qty: 30 TABLET | Refills: 0 | Status: SHIPPED | OUTPATIENT
Start: 2021-01-14 | End: 2021-02-03

## 2021-01-14 RX ADMIN — NEOSTIGMINE 3 MG: 1 INJECTION INTRAVENOUS at 14:49

## 2021-01-14 RX ADMIN — SODIUM CHLORIDE, POTASSIUM CHLORIDE, SODIUM LACTATE AND CALCIUM CHLORIDE: 600; 310; 30; 20 INJECTION, SOLUTION INTRAVENOUS at 14:49

## 2021-01-14 RX ADMIN — SODIUM CHLORIDE, POTASSIUM CHLORIDE, SODIUM LACTATE AND CALCIUM CHLORIDE 9 ML/HR: 600; 310; 30; 20 INJECTION, SOLUTION INTRAVENOUS at 10:24

## 2021-01-14 RX ADMIN — ONDANSETRON 4 MG: 2 INJECTION INTRAMUSCULAR; INTRAVENOUS at 15:30

## 2021-01-14 RX ADMIN — SODIUM CHLORIDE, POTASSIUM CHLORIDE, SODIUM LACTATE AND CALCIUM CHLORIDE 500 ML: 600; 310; 30; 20 INJECTION, SOLUTION INTRAVENOUS at 16:29

## 2021-01-14 RX ADMIN — FENTANYL CITRATE 50 MCG: 50 INJECTION, SOLUTION INTRAMUSCULAR; INTRAVENOUS at 15:50

## 2021-01-14 RX ADMIN — LIDOCAINE HYDROCHLORIDE 50 MG: 10 INJECTION, SOLUTION EPIDURAL; INFILTRATION; INTRACAUDAL; PERINEURAL at 12:42

## 2021-01-14 RX ADMIN — SIMETHICONE 80 MG: 80 TABLET, CHEWABLE ORAL at 16:05

## 2021-01-14 RX ADMIN — CEFAZOLIN SODIUM 2 G: 2 INJECTION, SOLUTION INTRAVENOUS at 12:47

## 2021-01-14 RX ADMIN — HYDROMORPHONE HYDROCHLORIDE 0.5 MG: 1 INJECTION, SOLUTION INTRAMUSCULAR; INTRAVENOUS; SUBCUTANEOUS at 15:25

## 2021-01-14 RX ADMIN — FAMOTIDINE 20 MG: 20 TABLET, FILM COATED ORAL at 10:24

## 2021-01-14 RX ADMIN — ROCURONIUM BROMIDE 10 MG: 10 INJECTION INTRAVENOUS at 14:06

## 2021-01-14 RX ADMIN — PROPOFOL 150 MG: 10 INJECTION, EMULSION INTRAVENOUS at 12:42

## 2021-01-14 RX ADMIN — LIDOCAINE HYDROCHLORIDE 0.5 ML: 10 INJECTION, SOLUTION EPIDURAL; INFILTRATION; INTRACAUDAL; PERINEURAL at 10:24

## 2021-01-14 RX ADMIN — ESMOLOL HYDROCHLORIDE 10 MG: 10 INJECTION, SOLUTION INTRAVENOUS at 13:17

## 2021-01-14 RX ADMIN — FENTANYL CITRATE 100 MCG: 50 INJECTION, SOLUTION INTRAMUSCULAR; INTRAVENOUS at 12:42

## 2021-01-14 RX ADMIN — ACETAMINOPHEN 650 MG: 325 TABLET ORAL at 10:24

## 2021-01-14 RX ADMIN — GLYCOPYRROLATE 0.4 MG: 0.4 INJECTION INTRAMUSCULAR; INTRAVENOUS at 14:49

## 2021-01-14 RX ADMIN — FENTANYL CITRATE 50 MCG: 50 INJECTION, SOLUTION INTRAMUSCULAR; INTRAVENOUS at 15:10

## 2021-01-14 RX ADMIN — FENTANYL CITRATE 50 MCG: 50 INJECTION, SOLUTION INTRAMUSCULAR; INTRAVENOUS at 15:15

## 2021-01-14 RX ADMIN — PROPOFOL 25 MCG/KG/MIN: 10 INJECTION, EMULSION INTRAVENOUS at 12:48

## 2021-01-14 RX ADMIN — HYDROMORPHONE HYDROCHLORIDE 0.5 MG: 1 INJECTION, SOLUTION INTRAMUSCULAR; INTRAVENOUS; SUBCUTANEOUS at 15:35

## 2021-01-14 RX ADMIN — ONDANSETRON 4 MG: 2 INJECTION INTRAMUSCULAR; INTRAVENOUS at 14:42

## 2021-01-14 RX ADMIN — CELECOXIB 200 MG: 200 CAPSULE ORAL at 10:24

## 2021-01-14 RX ADMIN — ROCURONIUM BROMIDE 40 MG: 10 INJECTION INTRAVENOUS at 12:42

## 2021-01-14 RX ADMIN — DEXAMETHASONE SODIUM PHOSPHATE 8 MG: 4 INJECTION, SOLUTION INTRA-ARTICULAR; INTRALESIONAL; INTRAMUSCULAR; INTRAVENOUS; SOFT TISSUE at 12:49

## 2021-01-14 NOTE — INTERVAL H&P NOTE
"[facility] Pre-op    Full history and physical note from office is attached.    /72 (BP Location: Right arm, Patient Position: Sitting)   Pulse 71   Temp 98 °F (36.7 °C) (Temporal)   Resp 16   Ht 154.9 cm (61\")   Wt 53.5 kg (118 lb)   SpO2 98%   BMI 22.30 kg/m²     Immunizations:  Influenza:  No  Pneumococcal:  No  Tetanus:  no    LAB Results:  Lab Results   Component Value Date    WBC 6.60 01/13/2021    HGB 14.0 01/13/2021    HCT 42.8 01/13/2021    MCV 95.3 01/13/2021     01/13/2021    NEUTROABS 4.83 01/13/2021    GLUCOSE 104 (H) 01/13/2021    BUN 10 01/13/2021    CREATININE 0.70 01/13/2021    EGFRIFNONA 85 01/13/2021     01/13/2021    K 4.2 01/13/2021     01/13/2021    CO2 27.0 01/13/2021    CALCIUM 9.7 01/13/2021    ALBUMIN 4.30 01/13/2021    AST 18 01/13/2021    ALT 14 01/13/2021    BILITOT 0.5 01/13/2021       Cancer Staging (if applicable)  Cancer Patient: _x_ yes __no __unknown__N/A; If yes, clinical stage T:_1_ N:_0_M:_0_, stage group I        Impression: Endometrial cancer      Plan: TOTAL LAPAROSCOPIC HYSTERECTOMY BILATERAL SALPINGOOPHORECTOMY WITH DAVINCI ROBOT, SENTINEL LYMPH NODE DISSECTION, POSSIBLE COMPLETION LYMPHADENECTOMY      Windy Teran, PATEL   1/14/2021   11:22 EST     I saw and evaluated the patient. I agree with the findings and the plan of care as documented in the note.    Kell Monaco MD  01/14/21  12:21 EST    "

## 2021-01-14 NOTE — OP NOTE
Subjective     Date of Service:  01/14/21  Time of Service:  15:04 EST    Surgical Staff: Surgeon(s) and Role:     * Kell Monaco MD - Primary     * Elba Botello MD - Resident - Assisting   Additional Staff:    Pre-operative diagnosis(es): Pre-Op Diagnosis Codes:     * Endometrial cancer (CMS/HCC) [C54.1]     Post-operative diagnosis(es): Post-Op Diagnosis Codes:     * Endometrial cancer (CMS/HCC) [C54.1]   Procedure(s): Procedure(s):  TOTAL LAPAROSCOPIC HYSTERECTOMY BILATERAL SALPINGOOPHORECTOMY WITH DAVINCI ROBOT, SENTINEL LYMPH NODE DISSECTION     Antibiotics: cefazolin (Ancef) ordered on call to OR     Anesthesia: Type: General  ASA:  III     Objective      Operative findings:  At the time of laparoscopy, adnexa were unremarkable.  Obvious adenopathy or disease outside of the uterus.  Big Arm lymph node mapped on the left to the obturator lymph node group.  Big Arm lymph nodes did not map on the right.  Therefore, uterus was sent for frozen section.  There is a noninvasive low-grade endometrioid adenocarcinoma noted.  No further lymph node dissection was attempted.   Specimens removed: ID Type Source Tests Collected by Time   A : LEFT PELVIC SENTINEL LYMP NODE Tissue Big Arm Lymph Node TISSUE PATHOLOGY EXAM Kell Monaco MD 1/14/2021 1331   B :  Tissue Uterus with Cervix, Bilateral Tubes and Ovaries TISSUE PATHOLOGY EXAM Kell Monaco MD 1/14/2021 1406      Fluid Intake and Output: I/O this shift:  In: 1000 [I.V.:1000]  Out: 300 [Urine:250; Blood:50]   Blood products used: No   Drains: Urethral Catheter Silicone 16 Fr. (Active)      Implant Information: Nothing was implanted during the procedure   Complications:  No immediate   Intraoperative consult(s):    Condition: stable   Disposition: to PACU and then possible discharge home       Indications:  Patient is a pleasant 65-year-old woman who was diagnosed with a grade 1 endometrioid adenocarcinoma.  This was a clinical stage I (T1 NOS  N0M0) cancer.  Consent was signed and on chart.    Procedure: Procedure:     After obtaining informed consent, the patient was taken to the operating room and underwent general endotracheal anesthesia after patient and site verification. The feet were placed in Mani stirrups. The arms were tucked at the sides. Steep Trendelenburg positioning was tested and found to be adequate. The abdomen, perineum, and vagina were prepped and draped in the usual sterile fashion. Sarabia catheter was anchored. Retractors were used to visualize the cervix.  Cervix was injected at 3 and 9:00 using fluorescein dye in the usual fashion.  Cervix was sounded and the appropriate uterine manipulator was called for.  Uterine manipulator was secured with out difficulty.  Attention was turned to the abdomen. Prior to each incision, skin was injected with 0.5% Marcaine with epinephrine.  An 8 mm trocar was inserted at the umbilical midline position in the Optiview technique.  Laparoscope was introduced to confirm positioning. Steep Trendelenburg was called for. The abdomen was insufflated to a pressure of 15 mmHg with CO2 gas.  2 left-sided 8 mm and 2 right-sided 8 mm trochars were all placed under direct visualization.  The above findings were noted.  Da Edin robot was docked to the patient and surgeon retired to the operating console.    Left pelvic sentinel lymph node dissection was performed according to the usual anatomic landmarks including the aorta and its bifurcation, the common iliac arteries and their bifurcations, the external and internal iliac arteries, the obturator and genitofemoral nerves, and the umbilical ligament. Pelvic lymph nodes were placed in Endo Catch bags and removed from the field.  Right pelvis, common, and periaortic lymph node areas showed no increased uptake of fluorescein dye.  Therefore, uterus was sent for frozen section as detailed below.    The peritoneum overlying the pelvic sidewalls was divided with  monopolar scissors. Infundibulopelvic ligaments were isolated from surrounding structures and pedicles were created using bipolar cautery and scissors. Likewise, the round ligaments were divided. Anterior and posterior leaves of the broad ligament were divided with monopolar scissors. A bladder flap was developed.  Uterine vessels were isolated. Pedicles were created at the level of the uterine vessels using bipolar cautery and scissors. Cardinal ligament and uterosacral ligament complexes were divided in a similar fashion. Monopolar scissors were used to perform a circumferential colpotomy and the specimen was handed off intact via the vagina for frozen section with the above noted results.     The vaginal cuff was closed with 0 Vicryl suture in a running locking fashion x2 layers. Good hemostasis was noted. Additional hemostasis was achieved at the pelvis as needed using bipolar cautery. Da Edin robot was undocked from the patient and the surgeon returned to the bedside.  Trochars were removed under direct visualization.  CO2 gas was allowed to escape from the abdominal cavity. At that point, no fascial defects could be palpated.  The skin was closed with 3-0 Monocryl in subcuticular stitch and glue was placed at the skin. The vagina was inspected.  Occluder and all instruments had been removed from the vagina.  Good hemostasis was noted.  Bladder was back filled with 120 mL of sterile solution and the young was discontinued.  The patient was taken to the recovery room in good condition. There were no immediate complications. All counts were correct.      Kell Monaco MD  01/14/21  15:04 EST

## 2021-01-14 NOTE — ANESTHESIA PREPROCEDURE EVALUATION
Anesthesia Evaluation     Patient summary reviewed and Nursing notes reviewed   no history of anesthetic complications:  NPO Solid Status: > 8 hours  NPO Liquid Status: > 2 hours           Airway   Mallampati: II  TM distance: >3 FB  Neck ROM: full  No difficulty expected  Dental - normal exam     Pulmonary     breath sounds clear to auscultation  (-) decreased breath sounds  Cardiovascular   Exercise tolerance: good (4-7 METS)    Rhythm: regular  Rate: normal        Neuro/Psych  (+) psychiatric history Anxiety,     GI/Hepatic/Renal/Endo    (+)   renal disease CRI,     ROS Comment: PT HAS 1 FUNCTIONING KIDNEY    Musculoskeletal     Abdominal   (-) obese    Abdomen: soft.   Substance History      OB/GYN          Other      history of cancer active                    Anesthesia Plan    ASA 3     general     intravenous induction     Anesthetic plan, all risks, benefits, and alternatives have been provided, discussed and informed consent has been obtained with: patient.    Plan discussed with CRNA.

## 2021-01-14 NOTE — ANESTHESIA PROCEDURE NOTES
Airway  Urgency: elective    Date/Time: 1/14/2021 1:00 PM  Airway not difficult    General Information and Staff    Patient location during procedure: OR  CRNA: Troy Alcantar CRNA    Indications and Patient Condition  Indications for airway management: airway protection    Preoxygenated: yes  MILS not maintained throughout  Mask difficulty assessment: 1 - vent by mask    Final Airway Details  Final airway type: endotracheal airway      Successful airway: ETT  Cuffed: yes   Successful intubation technique: direct laryngoscopy  Facilitating devices/methods: Bougie and anterior pressure/BURP  Endotracheal tube insertion site: oral  Blade: Funes  Blade size: 2  ETT size (mm): 7.0  Cormack-Lehane Classification: grade IIa - partial view of glottis  Placement verified by: chest auscultation and capnometry   Cuff volume (mL): 5  Measured from: lips  ETT/EBT  to lips (cm): 21  Number of attempts at approach: 1  Assessment: lips, teeth, and gum same as pre-op and atraumatic intubation    Additional Comments  Negative epigastric sounds, Breath sound equal bilaterally with symmetric chest rise and fall.

## 2021-01-14 NOTE — ANESTHESIA POSTPROCEDURE EVALUATION
Patient: Kelsie Wilson    Procedure Summary     Date: 01/14/21 [place] / Location:  [place] [facility] [place]  [place]    Anesthesia Start: 1238 Anesthesia Stop:     Procedure: TOTAL LAPAROSCOPIC HYSTERECTOMY BILATERAL SALPINGOOPHORECTOMY WITH DAVINCI ROBOT, SENTINEL LYMPH NODE DISSECTION (N/A Abdomen) Diagnosis:       Endometrial cancer (CMS/HCC)      (Endometrial cancer (CMS/HCC) [C54.1])    Surgeon: Kell Monaco MD Provider: Duane Schaffer Jr., MD    Anesthesia Type: general ASA Status: 3          Anesthesia Type: general    Vitals  Vitals Value Taken Time   /60 01/14/21 1503   Temp 97.3 °F (36.3 °C) 01/14/21 1503   Pulse 67 01/14/21 1503   Resp 20 01/14/21 1503   SpO2 100 % 01/14/21 1503           Post Anesthesia Care and Evaluation    Patient location during evaluation: PACU  Patient participation: complete - patient cannot participate  Level of consciousness: responsive to physical stimuli  Pain score: 0  Pain management: adequate  Airway patency: patent  Anesthetic complications: No anesthetic complications    Cardiovascular status: stable  Respiratory status: acceptable, nasal cannula, oral airway and spontaneous ventilation  Hydration status: stable    Comments: Pt transferred to PACU with O2. Vital signs stable. Report to PACU RN and care accepted.

## 2021-01-22 ENCOUNTER — TELEPHONE (OUTPATIENT)
Dept: GYNECOLOGIC ONCOLOGY | Facility: CLINIC | Age: 66
End: 2021-01-22

## 2021-01-22 NOTE — TELEPHONE ENCOUNTER
----- Message from Kell Monaco MD sent at 1/21/2021  3:26 PM EST -----  Please inform patient of early endometrial cancer.  Plan for observation.  Thank you    ----- Message -----  From: Lab, Background User  Sent: 1/14/2021   2:31 PM EST  To: Kell Monaco MD

## 2021-01-22 NOTE — TELEPHONE ENCOUNTER
I called pt to notify her that path came back and it was early endometrial cancer and we would continue observation. Pt was tearful. She states she has been very depressed and has had a bad appetite. I will call her and see if she would like to speak to Aaliyah Rouse.   She declined to see her JH.

## 2021-02-01 NOTE — PROGRESS NOTES
"Kelsie Wilson  [id number]  1955      Reason for Visit: Postoperative evaluation    History of Present Illness:  Patient is a very pleasant 66 y.o. woman who presents for a post operative evaluation status post TOTAL LAPAROSCOPIC HYSTERECTOMY BILATERAL SALPINGOOPHORECTOMY WITH DAVINCI ROBOT, SENTINEL LYMPH NODE DISSECTION performed on 1/14/21.      Surgery and hospital course were uncomplicated.  Today, patient notes normal bowel and bladder function.  Her pain is well controlled. She has questions about resuming normal activities.     Past Medical History, Past Surgical History, Social History, Family History have been reviewed and are without significant changes except as mentioned.    Review of Systems   All other systems were reviewed and are negative except as mentioned above.    Medications:  The current medication list was reviewed in the EMR    ALLERGIES:    Allergies   Allergen Reactions   • Codeine GI Intolerance   • Fish-Derived Products GI Intolerance     /69   Pulse 88   Temp 97.3 °F (36.3 °C) (Temporal)   Resp 16   Ht 154.9 cm (60.98\")   Wt 54.4 kg (120 lb)   BMI 22.69 kg/m²      Physical Exam  Constitutional:  Patient is a pleasant woman in no acute distress.  Gastrointestinal: Abdomen is soft and appropriately tender.  There is no mass palpated.  There is no rebound or guarding.  Incision(s) is clean, dry and intact.  Extremities:  Bilateral lower extremities are non-tender.  Gynecologic:External genitalia are free from lesion. On speculum examination, the vaginal cuff was intact and no lesions were appreciated.  Scant bloody discharge was noted.  No area of active bleeding was visualized.  On bimanual examination, no fullness was appreciated.  Uterus, cervix and adnexa were absent.  There was no significant tenderness.  Rectovaginal exam was deferred.      PATHOLOGY:  Final Diagnosis   1.  CERVIX, UTERUS, BILATERAL FALLOPIAN TUBES AND OVARIES, HYSTERECTOMY WITH BILATERAL " SALPINGO-OOPHORECTOMY:                Endometrioid adenocarcinoma, Grade 1                No invasion into the myometrium                Adenomyosis, extensive                Leiomyoma, multiple (up to 1.2 cm)                Chronic cervicitis                Small endocervical polyp                Fibroma, right ovary (0.8 cm)                Benign fallopian tubes x2 with small paratubal cysts    2.  Lymph node, left pelvic sentinel, excision:                Negative for metastatic carcinoma in 1 benign lymph node (0/1)                Cytokeratin 7 is negative for the presence of epithelial cells, with appropriate controls        UTERUS ENDOMETRIUM TEMPLATE:  SPECIMEN TYPE/ PROCEDURE:  Total hysterectomy and bilateral salpingo-oophorectomy  LYMPH NODE SAMPLING:  Left  SPECIMEN INTEGRITY: Intact  TUMOR SIZE (greatest dimension): 1 cm  HISTOLOGIC TYPE: Endometrioid adenocarcinoma  HISTOLOGIC GRADE: Grade 1  MYOMETRIAL INVASION (Present/Not identified): Not present               DEPTH OF INVASION (mm): Not present               MYOMETRIAL THICKNESS (mm): 1.1 cm               PERCENTAGE OF MYOMETRIAL INVASION: 0%  INVOLVEMENT OF CERVICAL STROMA: Not involved  EXTENT OF INVOLVEMENT OF OTHER TISSUE/ORGANS: Not involved  UTERINE SEROSA INVOLVEMENT: Not involved  MARGINS: Margins are negative for carcinoma  PELVIC LYMPH NODES (SUBMITTED/NONE): Submitted  NUMBER OF PELVIC LYMPH NODES EXAMINED: 1  NUMBER OF PELVIC SENTINEL NODES EXAMINED: 1  LATERALITY OF PELVIC LYMPH NODES EXAMINED: Left  NUMBER OF PELVIC LYMPH NODES WITH MACROMETASTASIS: 0  NUMBER OF PELVIC LYMPH NODES WITH MICROMETASTASIS: 0   NUMBER OF PELVIC LYMPH NODES WITH ITC’S: 0  LATERALITY OF PELVIC NODES WITH TUMOR: None  PARA-AORTIC LYMPH NODES (SUBMITTED/NONE): None  NUMBER OF PARA-AORTIC LYMPH NODES EXAMINED: 0  LATERALITY OF PARA-AORTIC LYMPH NODES EXAMINED: None  NUMBER OF PARA-AORTIC LYMPH NODES EXAMINED: 0  NUMBER OF PARA-AORTIC LYMPH NODES WITH  MACROMETASTASIS: 0  NUMBER OF PARA-AORTIC LYMPH NODES WITH MICROMETASTASIS: 0  NUMBER OF PARA-AORTIC LYMPH NODES WITH ITC’S: 0  LATERALITY OF PARA-AORTIC LYMPH NODES WITH TUMOR: None  LYMPHVASCULAR INVASION: Not identified  MSI TESTING:   Results to be issued as an Addendum  ADDITIONAL PATHOLOGIC FINDINGS: Adenomyosis, leiomyoma  ANCILLARY STUDIES: Upon request  CC PATHOLOGIC STAGE:  (COMPLETED BY PATHOLOGIST, BASED ONLY ON TISSUE FINDINGS, MORE EXTENSIVE DISEASE MAY NOT BE KNOWN TO THE PATHOLOGIST)  pT=  pT1a (FIGO Stage IA)  pN=    pN0  pM=    pMX  AJCC PATHOLOGIC STAGE:  IA  MSI final testing pending.       ASSESSMENT/PLAN:  Kelsie Wilson returns for a post-operative evaluation today.  All pathology reports were discussed with the patient.  I recommended observation for this very early endometrial cancer.  Patient complains of right shoulder pain and right rib cage pain.  She did have a fall prior to surgery.  I offered physical therapy to address her discomfort and encouraged her to follow-up with her primary care provider.  She declined physical therapy referral.  She notes this discomfort is not related to her surgical procedure.      Overall, the patient is very pleased with her care.  I recommended continuation of post operative precautions as discussed.     She is to follow-up in 6 months time for survivorship.    Patient was seen and examined with Dr. Armstrong,  resident, who performed portions of the examination and documentation for this patient's care under my direct supervision.  I agree with the above documentation and plan.    Kell Monaco MD  02/03/21  11:16 EST

## 2021-02-03 ENCOUNTER — OFFICE VISIT (OUTPATIENT)
Dept: GYNECOLOGIC ONCOLOGY | Facility: CLINIC | Age: 66
End: 2021-02-03

## 2021-02-03 VITALS
TEMPERATURE: 97.3 F | DIASTOLIC BLOOD PRESSURE: 69 MMHG | BODY MASS INDEX: 22.66 KG/M2 | WEIGHT: 120 LBS | SYSTOLIC BLOOD PRESSURE: 156 MMHG | HEART RATE: 88 BPM | RESPIRATION RATE: 16 BRPM | HEIGHT: 61 IN

## 2021-02-03 DIAGNOSIS — Z98.890 POST-OPERATIVE STATE: Primary | ICD-10-CM

## 2021-02-03 PROCEDURE — 99024 POSTOP FOLLOW-UP VISIT: CPT | Performed by: OBSTETRICS & GYNECOLOGY

## 2021-02-11 LAB
CYTO UR: NORMAL
LAB AP CASE REPORT: NORMAL
LAB AP CLINICAL INFORMATION: NORMAL
LAB AP INTEGRATED ONCOLOGY, ADDENDUM: NORMAL
Lab: NORMAL
PATH REPORT.ADDENDUM SPEC: NORMAL
PATH REPORT.FINAL DX SPEC: NORMAL
PATH REPORT.GROSS SPEC: NORMAL

## 2021-09-13 ENCOUNTER — CLINICAL SUPPORT (OUTPATIENT)
Dept: GYNECOLOGIC ONCOLOGY | Facility: CLINIC | Age: 66
End: 2021-09-13

## 2021-09-13 VITALS
SYSTOLIC BLOOD PRESSURE: 166 MMHG | RESPIRATION RATE: 15 BRPM | WEIGHT: 121.8 LBS | BODY MASS INDEX: 23 KG/M2 | HEIGHT: 61 IN | OXYGEN SATURATION: 98 % | DIASTOLIC BLOOD PRESSURE: 71 MMHG | HEART RATE: 85 BPM

## 2021-09-13 DIAGNOSIS — N90.4 LICHEN SCLEROSUS OF FEMALE GENITALIA: ICD-10-CM

## 2021-09-13 DIAGNOSIS — C54.1 ENDOMETRIAL CANCER (HCC): Primary | ICD-10-CM

## 2021-09-13 PROCEDURE — 99214 OFFICE O/P EST MOD 30 MIN: CPT | Performed by: NURSE PRACTITIONER

## 2021-09-13 NOTE — PROGRESS NOTES
GYN ONCOLOGY CANCER SURVIVORSHIP VISIT    Kelsie Wilson  [id number]  1955    Subjective   Chief Complaint: Follow-up (Survivorship, c/o external irritation)        History of present illness:     Kelsie Wilson is a 66 y.o. year old female who is here today for her Cancer Survivorship visit, see oncology history below. She is feeling well recovered from surgery. She is pleased with her outcomes and care thus far. Upon arrival patient c/o external genital irritation x several months. She reports symptoms come and go, causing itching and irritation. She denies any lesions, bleeding, vaginal discharge, or pain. She reports the itching is very aggravating during the work day. Warm wash cloth provides temporary relief, but symptoms recur. Otherwise, she is feeling well. She denies vaginal bleeding, pelvic pain, or changes in bowel or bladder function.      Cancer History:   Oncology/Hematology History   Endometrial cancer (CMS/HCC)   1/8/2021 Initial Diagnosis    Patient presented to Dr. Berger with c/o postmenopausal spotting x 1 month. Ultrasound showed thickened endometrium. Endometrial biopsy revealed grade 1 endometrioid adenocarcinoma. Referred to Gyn Oncology     1/14/2021 Surgery    RTLH/BSO and sentinel lymph node dissection by Dr. Kell Monaco at [facility].    Final pathology showed 1 cm endometrioid tumor, non-invasive into the myometrium. Nodes and all other structures negative. MMR showed absent MLH1 and PMS2, reflex hypermethylation promoter testing positive = sporadic tumor, no further testing needed.   Stage IA grade 1            The current medication list and allergy list were reviewed and reconciled.     Past Medical History, Past Surgical History, Social History, Family History have been reviewed and are without significant changes except as mentioned.        Review of Systems   Constitutional: Negative.    Gastrointestinal: Negative.    Genitourinary: Negative.  Negative for dysuria, genital  "sores, pelvic pain, vaginal bleeding, vaginal discharge and vaginal pain.        C/o external genital itching/irritation, intermittent       Objective   Physical Exam  Vital Signs: /71   Pulse 85   Resp 15   Ht 154.9 cm (60.98\")   Wt 55.2 kg (121 lb 12.8 oz)   SpO2 98%   BMI 23.03 kg/m²   Vitals:    09/13/21 1303   PainSc: 0-No pain           General Appearance:  alert, cooperative, no apparent distress, appears stated age and normal weight   Neurologic/Psychiatric: A&O x 3, gait steady, appropriate affect   Abdomen:   Soft, non-tender, non-distended and no organomegaly   Lymph nodes: No cervical, supraclavicular, inguinal adenopathy noted   Extremities: Normal, atraumatic; no clubbing, cyanosis, or edema    Pelvic: External Genitalia  atrophic, with tissue changes and labial fusion consistent with lichen sclerosus. No lesions  Vagina  is pale, atrophic.   Vaginal Cuff  Female Vaginal Cuff: smooth, intact and without visible lesions  Uterus  surgically absent and no palpable masses  Ovaries  surgically absent bilaterally  Parametria  smooth  Rectovaginal  Female rectovaginal: deferred     ECOG score: 0           Survivorship Needs Assessment:  PT/Rehab  Health history, treatment course, and current status. The patient is not in need of physical therapy or rehabilitation services.    Behavioral Health  A post-treatment depression screening has been completed. PHQ-9 results show 0 (No Depression). The patient has not previously established mental health care. A referral is not recommended at this time. The patient is not in need of referral to [facility].      Procedure Note:  No notes on file         Assessment and Plan:     Diagnoses and all orders for this visit:    1. Endometrial cancer (CMS/HCC) (Primary)    2. Lichen sclerosus of female genitalia  -     triamcinolone (KENALOG) 0.1 % ointment; Apply 1 application topically to the appropriate area as directed 2 (Two) Times a Day.  Dispense: 30 g; " Refill: 3        There is no evidence of disease upon today's exam. Plan for every 6 month cancer surveillance visits for the first 2 years, then yearly. She is understanding to call with any changes in pelvic symptoms or general GYN concerns at any time between regularly scheduled visits.     The patient and I have reviewed her Survivorship Care Plan in detail. We discussed her diagnosis, pathology, histology, all treatments, and ongoing surveillance recommendations. All questions were answered to her satisfaction. She is in agreement with our plan for ongoing surveillance as outlined in her plan. A copy of this document was provided to her at the completion of our visit.  A copy has also been sent to her primary care provider.    Patient and I discussed her external genital symptoms, changes found to be consistent with lichen sclerosus upon exam. Discussed standard treatment with topical steroids. We will begin with triamcinolone BID during flare-ups. If ineffective, may change to clobetasol in the future. Trimacinolone Rx sent to pharmacy. We reviewed medication instructions, risks, benefits, and potential side effects. She is encouraged to call if symptoms worsen or persist despite treatment.     Pain assessment was performed today as a part of patient’s care. For patients with pain related to surgery, gynecologic malignancy or cancer treatment, the plan is as noted in the assessment/plan.  For patients with pain not related to these issues, they are to seek any further needed care from a more appropriate provider, such as PCP.    I spent 30 minutes caring for Kelsie on this date of service. This time includes time spent by me in the following activities: preparing for the visit, performing a medically appropriate examination and/or evaluation, counseling and educating the patient/family/caregiver, ordering medications, tests, or procedures and documenting information in the medical record.       Follow Up    Return to clinic in 6 months for ongoing cancer surveillance.      Electronically signed by PATEL Canela on 09/13/21 at 13:40 EDT

## 2022-03-14 ENCOUNTER — OFFICE VISIT (OUTPATIENT)
Dept: GYNECOLOGIC ONCOLOGY | Facility: CLINIC | Age: 67
End: 2022-03-14

## 2022-03-14 VITALS
RESPIRATION RATE: 16 BRPM | HEART RATE: 77 BPM | OXYGEN SATURATION: 99 % | BODY MASS INDEX: 23.82 KG/M2 | WEIGHT: 126 LBS | TEMPERATURE: 97.8 F

## 2022-03-14 DIAGNOSIS — N90.4 LICHEN SCLEROSUS OF FEMALE GENITALIA: ICD-10-CM

## 2022-03-14 DIAGNOSIS — Z85.42 HISTORY OF ENDOMETRIAL CANCER: Primary | ICD-10-CM

## 2022-03-14 PROCEDURE — 99213 OFFICE O/P EST LOW 20 MIN: CPT | Performed by: NURSE PRACTITIONER

## 2022-03-14 NOTE — PROGRESS NOTES
GYN ONCOLOGY CANCER SURVEILLANCE FOLLOW-UP    Kelsie Wilson  [id number]  1955    Subjective   Chief Complaint: Uterine Cancer (No complaints)        History of present illness:     Kelsie Wilson is a 67 y.o. year old female who is here today for ongoing surveillance of Endometrial Cancer, see Cancer History. She is now over 1 year out from completion of treatment with surgery alone. She reports she is feeling very well today and has no complaints. She denies vaginal bleeding, pelvic pain, and changes in bowel or bladder function. She reports topical triamcinolone has been very helpful for lichen sclerosus symptoms, requests refills.        Cancer History:   Oncology/Hematology History   Endometrial cancer (HCC)   1/8/2021 Initial Diagnosis    Patient presented to Dr. Berger with c/o postmenopausal spotting x 1 month. Ultrasound showed thickened endometrium. Endometrial biopsy revealed grade 1 endometrioid adenocarcinoma. Referred to Gyn Oncology     1/14/2021 Surgery    RTLH/BSO and sentinel lymph node dissection by Dr. Kell Monaco at [facility].    Final pathology showed 1 cm endometrioid tumor, non-invasive into the myometrium. Nodes and all other structures negative. MMR showed absent MLH1 and PMS2, reflex hypermethylation promoter testing positive = sporadic tumor, no further testing needed.   Stage IA grade 1        9/13/2021 Survivorship    Survivorship Care Plan completed and discussed with patient.  Copy of Survivorship Care Plan provided to patient and primary care provider.           The current medication list and allergy list were reviewed and reconciled.     Past Medical History, Past Surgical History, Social History, Family History have been reviewed and are without significant changes except as mentioned.      Review of Systems   Constitutional: Negative.    Gastrointestinal: Negative.    Genitourinary: Negative.          Objective   Physical Exam  Vital Signs: Pulse 77   Temp 97.8 °F (36.6  °C)   Resp 16   Wt 57.2 kg (126 lb)   SpO2 99%   BMI 23.82 kg/m²   Vitals:    03/14/22 0923   PainSc: 0-No pain           General Appearance:  alert, cooperative, no apparent distress, appears stated age and normal weight   Neurologic/Psychiatric: A&O x 3, gait steady, appropriate affect   HEENT:  Normocephalic, without obvious abnormality, mucous membranes moist   Abdomen:   Soft, non-tender, non-distended and no organomegaly   Lymph nodes: No cervical, supraclavicular, inguinal adenopathy noted   Pelvic: External Genitalia  atrophic, with tissues and labial fusion consistent with lichen sclerosus. No lesions  Vagina  is pale, atrophic.   Vaginal Cuff  Female Vaginal Cuff: smooth, intact and without visible lesions  Uterus  surgically absent and no palpable masses  Ovaries  surgically absent bilaterally  Parametria  smooth     ECOG score: 0             PHQ-9 Total Score: 0    Procedure Note:            Assessment and Plan:    Diagnoses and all orders for this visit:    1. History of endometrial cancer (Primary)    2. Lichen sclerosus of female genitalia  -     triamcinolone (KENALOG) 0.1 % ointment; Apply 1 application topically to the appropriate area as directed 2 (Two) Times a Day.  Dispense: 30 g; Refill: 3          There is no evidence of disease upon today's exam. Continue every 6 month cancer surveillance visits until the 2 year elias, then yearly. Patient is in need of a yearly physical, will perform next visit. She is understanding to call with any changes in pelvic symptoms or general GYN concerns at any time between regularly scheduled visits.     Continue triamcinolone as prescribed. Refills given.     Pain assessment was performed today as a part of patient’s care.  For patients with pain related to surgery, gynecologic malignancy or cancer treatment, the plan is as noted in the assessment/plan.  For patients with pain not related to these issues, they are to seek any further needed care from a more  appropriate provider, such as PCP.      Follow-up:     Return to clinic in 6 months for ongoing cancer surveillance.      Electronically signed by PATEL Canela on 03/14/22 at 10:01 EDT

## 2022-09-14 ENCOUNTER — OFFICE VISIT (OUTPATIENT)
Dept: GYNECOLOGIC ONCOLOGY | Facility: CLINIC | Age: 67
End: 2022-09-14

## 2022-09-14 VITALS
WEIGHT: 125.8 LBS | BODY MASS INDEX: 23.78 KG/M2 | SYSTOLIC BLOOD PRESSURE: 165 MMHG | TEMPERATURE: 97.5 F | DIASTOLIC BLOOD PRESSURE: 74 MMHG | OXYGEN SATURATION: 98 % | RESPIRATION RATE: 16 BRPM | HEART RATE: 75 BPM

## 2022-09-14 DIAGNOSIS — N90.4 LICHEN SCLEROSUS OF FEMALE GENITALIA: ICD-10-CM

## 2022-09-14 DIAGNOSIS — Z85.42 HISTORY OF ENDOMETRIAL CANCER: Primary | ICD-10-CM

## 2022-09-14 PROCEDURE — 99213 OFFICE O/P EST LOW 20 MIN: CPT | Performed by: NURSE PRACTITIONER

## 2022-09-14 NOTE — PROGRESS NOTES
GYN ONCOLOGY CANCER SURVEILLANCE FOLLOW-UP    Kelsie Wilson  [id number]  1955    Subjective   Chief Complaint: Uterine Cancer (No complaints)        History of present illness:     Kelsie Wilson is a 67 y.o. year old female who is here today for ongoing surveillance of Endometrial Cancer, see Cancer History. She is will reach 2 years out from completion of treatment with surgery alone this winter. She reports she is feeling very well today and has no complaints. She denies vaginal bleeding, pelvic pain, and changes in bowel or bladder function.        Cancer History:   Oncology/Hematology History   Endometrial cancer (HCC)   1/8/2021 Initial Diagnosis    Patient presented to Dr. Berger with c/o postmenopausal spotting x 1 month. Ultrasound showed thickened endometrium. Endometrial biopsy revealed grade 1 endometrioid adenocarcinoma. Referred to Gyn Oncology     1/14/2021 Surgery    RTLH/BSO and sentinel lymph node dissection by Dr. Kell Monaco at [facility].    Final pathology showed 1 cm endometrioid tumor, non-invasive into the myometrium. Nodes and all other structures negative. MMR showed absent MLH1 and PMS2, reflex hypermethylation promoter testing positive = sporadic tumor, no further testing needed.   Stage IA grade 1        9/13/2021 Survivorship    Survivorship Care Plan completed and discussed with patient.  Copy of Survivorship Care Plan provided to patient and primary care provider.           The current medication list and allergy list were reviewed and reconciled.     Past Medical History, Past Surgical History, Social History, Family History have been reviewed and are without significant changes except as mentioned.      Review of Systems   Constitutional: Negative.    Gastrointestinal: Negative.    Genitourinary: Negative.          Objective   Physical Exam  Vital Signs: /74   Pulse 75   Temp 97.5 °F (36.4 °C) (Temporal)   Resp 16   Wt 57.1 kg (125 lb 12.8 oz)   SpO2 98%   BMI  23.78 kg/m²   Vitals:    09/14/22 0911   PainSc: 0-No pain           General Appearance:  alert, cooperative, no apparent distress, appears stated age and normal weight   Neurologic/Psychiatric: A&O x 3, gait steady, appropriate affect   HEENT:  Normocephalic, without obvious abnormality, mucous membranes moist   Abdomen:   Soft, non-tender, non-distended and no organomegaly   Lymph nodes: No cervical, supraclavicular, inguinal adenopathy noted   Pelvic: External Genitalia  atrophic, with tissues and labial fusion consistent with lichen sclerosus. No lesions  Vagina  is pale, atrophic.   Vaginal Cuff  Female Vaginal Cuff: smooth, intact and without visible lesions  Uterus  surgically absent and no palpable masses  Ovaries  surgically absent bilaterally  Parametria  smooth     ECOG score: 0             PHQ-9 Total Score:      Procedure Note:            Assessment and Plan:    Diagnoses and all orders for this visit:    History of endometrial cancer    Lichen sclerosus of female genitalia            There is no evidence of disease upon today's exam. Plan for one more 6 month surveillance visit to surpass the 2 year elias, then yearly. She is understanding to call with any changes in pelvic symptoms or general GYN concerns at any time between regularly scheduled visits.     Continue triamcinolone as prescribed for control of lichen sclerosus.     Pain assessment was performed today as a part of patient’s care.  For patients with pain related to surgery, gynecologic malignancy or cancer treatment, the plan is as noted in the assessment/plan.  For patients with pain not related to these issues, they are to seek any further needed care from a more appropriate provider, such as PCP.      Follow-up:     Return to clinic in 6 months for ongoing cancer surveillance.      Electronically signed by PATEL Canela on 09/14/22 at 09:29 EDT

## 2023-03-14 ENCOUNTER — OFFICE VISIT (OUTPATIENT)
Dept: GYNECOLOGIC ONCOLOGY | Facility: CLINIC | Age: 68
End: 2023-03-14
Payer: COMMERCIAL

## 2023-03-14 VITALS
RESPIRATION RATE: 18 BRPM | HEIGHT: 61 IN | DIASTOLIC BLOOD PRESSURE: 89 MMHG | OXYGEN SATURATION: 98 % | WEIGHT: 120.4 LBS | TEMPERATURE: 97.3 F | BODY MASS INDEX: 22.73 KG/M2 | SYSTOLIC BLOOD PRESSURE: 179 MMHG | HEART RATE: 94 BPM

## 2023-03-14 DIAGNOSIS — N90.4 LICHEN SCLEROSUS OF FEMALE GENITALIA: ICD-10-CM

## 2023-03-14 DIAGNOSIS — Z85.42 HISTORY OF ENDOMETRIAL CANCER: Primary | ICD-10-CM

## 2023-03-14 PROCEDURE — 99213 OFFICE O/P EST LOW 20 MIN: CPT | Performed by: NURSE PRACTITIONER

## 2023-03-14 NOTE — PROGRESS NOTES
GYN ONCOLOGY CANCER SURVEILLANCE FOLLOW-UP    Kelsie Wilson  [id number]  1955    Subjective   Chief Complaint: Uterine Cancer (No gyn complaints)        History of present illness:     Kelsie Wilson is a 68 y.o. year old female who is here today for ongoing surveillance of Endometrial Cancer, see Cancer History. She is now over 2 years out from completion of treatment with surgery alone. We also follow her vulvar lichen sclerosus, well controlled with triamcinolone. She denies any recent flares. She reports she is feeling very well today and has no gyn complaints. She denies vaginal bleeding, pelvic pain, and changes in bowel or bladder function. She mentions occasional aching at right groin, has known history of pelvic adhesions.        Cancer History:   Oncology/Hematology History   Endometrial cancer (HCC)   1/8/2021 Initial Diagnosis    Patient presented to Dr. Berger with c/o postmenopausal spotting x 1 month. Ultrasound showed thickened endometrium. Endometrial biopsy revealed grade 1 endometrioid adenocarcinoma. Referred to Gyn Oncology     1/14/2021 Surgery    RTLH/BSO and sentinel lymph node dissection by Dr. Kell Monaco at [facility].    Final pathology showed 1 cm endometrioid tumor, non-invasive into the myometrium. Nodes and all other structures negative. MMR showed absent MLH1 and PMS2, reflex hypermethylation promoter testing positive = sporadic tumor, no further testing needed.   Stage IA grade 1        9/13/2021 Survivorship    Survivorship Care Plan completed and discussed with patient.  Copy of Survivorship Care Plan provided to patient and primary care provider.           The current medication list and allergy list were reviewed and reconciled.     Past Medical History, Past Surgical History, Social History, Family History have been reviewed and are without significant changes except as mentioned.      Review of Systems   Constitutional: Negative.    Gastrointestinal: Negative.   "  Genitourinary: Negative.          Objective   Physical Exam  Vital Signs: /89   Pulse 94   Temp 97.3 °F (36.3 °C) (Temporal)   Resp 18   Ht 154.9 cm (61\")   Wt 54.6 kg (120 lb 6.4 oz)   SpO2 98%   BMI 22.75 kg/m²   Vitals:    03/14/23 0853   PainSc: 0-No pain           General Appearance:  alert, cooperative, no apparent distress, appears stated age and normal weight   Neurologic/Psychiatric: A&O x 3, gait steady, appropriate affect   HEENT:  Normocephalic, without obvious abnormality, mucous membranes moist   Abdomen:   Soft, non-tender, non-distended and no organomegaly   Lymph nodes: No cervical, supraclavicular, inguinal adenopathy noted   Pelvic: External Genitalia  atrophic, with tissues and labial fusion consistent with lichen sclerosus. No lesions  Vagina  is pale, atrophic.   Vaginal Cuff  Female Vaginal Cuff: smooth, intact and without visible lesions  Uterus  surgically absent and no palpable masses  Ovaries  surgically absent bilaterally  Parametria  smooth     ECOG score: 0             PHQ-9 Total Score: 0    Procedure Note:            Assessment and Plan:    Diagnoses and all orders for this visit:    History of endometrial cancer    Lichen sclerosus of female genitalia            There is no evidence of disease upon today's exam. She is now over 2 years out from completion of treatment with surgery alone. She may go to yearly surveillance visits. She is understanding to call with any changes in pelvic symptoms or general GYN concerns at any time between regularly scheduled visits.     Continue triamcinolone as prescribed for control of lichen sclerosus.     Pain assessment was performed today as a part of patient’s care.  For patients with pain related to surgery, gynecologic malignancy or cancer treatment, the plan is as noted in the assessment/plan.  For patients with pain not related to these issues, they are to seek any further needed care from a more appropriate provider, such as " PCP.      Follow-up:     Return to clinic in 1 year for ongoing cancer surveillance.      Electronically signed by PATEL Canela on 03/14/23 at 09:19 EDT

## 2024-01-17 ENCOUNTER — OFFICE VISIT (OUTPATIENT)
Age: 69
End: 2024-01-17
Payer: COMMERCIAL

## 2024-01-17 ENCOUNTER — LAB (OUTPATIENT)
Age: 69
End: 2024-01-17
Payer: COMMERCIAL

## 2024-01-17 VITALS
TEMPERATURE: 97.7 F | HEART RATE: 93 BPM | BODY MASS INDEX: 21.43 KG/M2 | HEIGHT: 61 IN | OXYGEN SATURATION: 98 % | DIASTOLIC BLOOD PRESSURE: 84 MMHG | SYSTOLIC BLOOD PRESSURE: 124 MMHG | WEIGHT: 113.5 LBS

## 2024-01-17 DIAGNOSIS — M54.6 PAIN IN THORACIC SPINE: Primary | ICD-10-CM

## 2024-01-17 DIAGNOSIS — K20.90 ESOPHAGITIS: ICD-10-CM

## 2024-01-17 DIAGNOSIS — Z12.11 ENCOUNTER FOR SCREENING FOR MALIGNANT NEOPLASM OF COLON: ICD-10-CM

## 2024-01-17 DIAGNOSIS — M54.6 ACUTE MIDLINE THORACIC BACK PAIN: ICD-10-CM

## 2024-01-17 DIAGNOSIS — M54.6 ACUTE MIDLINE THORACIC BACK PAIN: Primary | ICD-10-CM

## 2024-01-17 DIAGNOSIS — R13.19 ESOPHAGEAL DYSPHAGIA: ICD-10-CM

## 2024-01-17 LAB
CRP SERPL-MCNC: <0.3 MG/DL (ref 0–0.5)
ERYTHROCYTE [SEDIMENTATION RATE] IN BLOOD: 7 MM/HR (ref 0–30)

## 2024-01-17 PROCEDURE — 86140 C-REACTIVE PROTEIN: CPT | Performed by: STUDENT IN AN ORGANIZED HEALTH CARE EDUCATION/TRAINING PROGRAM

## 2024-01-17 PROCEDURE — 85652 RBC SED RATE AUTOMATED: CPT | Performed by: STUDENT IN AN ORGANIZED HEALTH CARE EDUCATION/TRAINING PROGRAM

## 2024-01-17 PROCEDURE — 36415 COLL VENOUS BLD VENIPUNCTURE: CPT | Performed by: STUDENT IN AN ORGANIZED HEALTH CARE EDUCATION/TRAINING PROGRAM

## 2024-01-17 RX ORDER — SUCRALFATE 1 G/1
1 TABLET ORAL 3 TIMES DAILY PRN
Qty: 60 TABLET | Refills: 3 | Status: SHIPPED | OUTPATIENT
Start: 2024-01-17 | End: 2024-01-17 | Stop reason: SDUPTHER

## 2024-01-17 RX ORDER — KETOROLAC TROMETHAMINE 10 MG/1
10 TABLET, FILM COATED ORAL EVERY 6 HOURS PRN
COMMUNITY
Start: 2024-01-10

## 2024-01-17 RX ORDER — PANTOPRAZOLE SODIUM 40 MG/1
40 TABLET, DELAYED RELEASE ORAL
Qty: 30 TABLET | Refills: 0 | Status: SHIPPED | OUTPATIENT
Start: 2024-01-17

## 2024-01-17 RX ORDER — SODIUM PICOSULFATE, MAGNESIUM OXIDE, AND ANHYDROUS CITRIC ACID 10; 3.5; 12 MG/160ML; G/160ML; G/160ML
350 LIQUID ORAL TAKE AS DIRECTED
Qty: 350 ML | Refills: 0 | Status: SHIPPED | OUTPATIENT
Start: 2024-01-17

## 2024-01-17 RX ORDER — METHOCARBAMOL 500 MG/1
500 TABLET, FILM COATED ORAL 3 TIMES DAILY
COMMUNITY
Start: 2024-01-03

## 2024-01-17 RX ORDER — SUCRALFATE 1 G/1
1 TABLET ORAL 3 TIMES DAILY PRN
Qty: 60 TABLET | Refills: 3 | Status: SHIPPED | OUTPATIENT
Start: 2024-01-17

## 2024-01-17 NOTE — PROGRESS NOTES
Office Note     Name: Kelsie Wilson    : 1955     MRN: [id number]     Chief Complaint  Back Pain (6 weeks of back pain. Pain pills, muscle relaxers, or injections has not helped. //Hurts worse after eating.)    Subjective     History of Present Illness:  Kelsie Wilson is a 68 y.o. female who presents today for initial visit to establish care. She has acute complaint of upper back pain with eating. She has chronic low back pain which is lower level, but this upper back/rib pain is a new issue for the last four to six weeks.  There is a strong association with foods, particularly with rough, sharp, or dry foods and she notes an almost immediate pain in her upper back after eating. The pain mostly goes away after eating.  She has lost 10 lbs in the last 6 weeks due to being unable to eat most foods.  The onset of this new pain was shortly after starting bactrim. She did note some feelings of the bactrim getting stuck in her throat.      Past Medical History:   Past Medical History:   Diagnosis Date    Anxiety about health 2021    Contact lens/glasses fitting     Endometrial cancer     Heartburn     Kidney infection     Post-menopausal bleeding        Past Surgical History:   Past Surgical History:   Procedure Laterality Date     SECTION      TOTAL LAPAROSCOPIC HYSTERECTOMY SALPINGO OOPHORECTOMY N/A 2021    Procedure: TOTAL LAPAROSCOPIC HYSTERECTOMY BILATERAL SALPINGOOPHORECTOMY WITH DAVINCI ROBOT, SENTINEL LYMPH NODE DISSECTION;  Surgeon: Kell Monaco MD;  Location: [facility];  Service: [facility];  Laterality: N/A;    TUBAL ABDOMINAL LIGATION         Immunizations:   Immunization History   Administered Date(s) Administered    COVID-19 (MODERNA) 1st,2nd,3rd Dose Monovalent 2021, 2021    Hepatitis A 2018        Medications:     Current Outpatient Medications:     sucralfate (Carafate) 1 g tablet, Take 1 tablet by mouth 3 (Three) Times a Day As Needed  "(Abdominal/back pain)., Disp: 60 tablet, Rfl: 3    triamcinolone (KENALOG) 0.1 % ointment, Apply 1 application topically to the appropriate area as directed 2 (Two) Times a Day. (Patient taking differently: Apply 1 application  topically to the appropriate area as directed As Needed.), Disp: 30 g, Rfl: 3    ketorolac (TORADOL) 10 MG tablet, Take 1 tablet by mouth Every 6 (Six) Hours As Needed. (Patient not taking: Reported on 1/17/2024), Disp: , Rfl:     methocarbamol (ROBAXIN) 500 MG tablet, Take 1 tablet by mouth 3 (Three) Times a Day. (Patient not taking: Reported on 1/17/2024), Disp: , Rfl:     pantoprazole (PROTONIX) 40 MG EC tablet, Take 1 tablet by mouth Every Morning Before Breakfast., Disp: 30 tablet, Rfl: 0    Allergies:   Allergies   Allergen Reactions    Codeine GI Intolerance     Other reaction(s): GI Intolerance    Sulfamethoxazole-Trimethoprim Nausea And Vomiting    Fish Allergy GI Intolerance     Other reaction(s): GI Intolerance    Fish-Derived Products GI Intolerance       Family History:   Family History   Problem Relation Age of Onset    Pancreatic cancer Mother     Hypertension Mother     Parkinsonism Father     Hypertension Father        Social History:   Social History     Socioeconomic History    Marital status:    Tobacco Use    Smoking status: Never    Smokeless tobacco: Never   Vaping Use    Vaping Use: Never used   Substance and Sexual Activity    Alcohol use: Never    Drug use: Never    Sexual activity: Not Currently     Birth control/protection: None         Objective     Vital Signs  /84   Pulse 93   Temp 97.7 °F (36.5 °C)   Ht 155 cm (61.02\")   Wt 51.5 kg (113 lb 8 oz)   SpO2 98%   BMI 21.43 kg/m²   Estimated body mass index is 21.43 kg/m² as calculated from the following:    Height as of this encounter: 155 cm (61.02\").    Weight as of this encounter: 51.5 kg (113 lb 8 oz).    BMI is within normal parameters. No other follow-up for BMI required.      Physical " Exam  Constitutional:       General: She is not in acute distress.     Appearance: She is not toxic-appearing.   Cardiovascular:      Rate and Rhythm: Normal rate and regular rhythm.      Heart sounds: No murmur heard.     No friction rub. No gallop.   Pulmonary:      Effort: Pulmonary effort is normal.      Breath sounds: Normal breath sounds.   Abdominal:      General: Abdomen is flat. There is no distension.   Skin:     General: Skin is warm and dry.   Neurological:      Mental Status: She is alert.   Psychiatric:         Mood and Affect: Mood normal.         Behavior: Behavior normal.          Assessment and Plan     1. Acute midline thoracic back pain  Check inflammatory labs given multifocal preceding symptoms, will pursue further autoimmune workup if elevated  - C-reactive Protein; Future  - Sedimentation rate, automated; Future    2. Esophageal dysphagia  Ordered an EGD, had some dysphagia preceding the painful symptoms which may have led to pill esophagitis, but EGD ordered in part because possible pill esophagitis symptoms have persisted weeks after stopping medication  - Ambulatory referral for Screening EGD      3. Esophagitis  Unclear etiology though suspect this is bactrim induced pill esophagitis.  -Complicated by weight loss of ten pounds in the last 6 weeks and persistence for weeks after stopping medication  -Will start carafate and pantoprazole  - sucralfate (Carafate) 1 g tablet; Take 1 tablet by mouth 3 (Three) Times a Day As Needed (Abdominal/back pain).  Dispense: 60 tablet; Refill: 3  - pantoprazole (PROTONIX) 40 MG EC tablet; Take 1 tablet by mouth Every Morning Before Breakfast.  Dispense: 30 tablet; Refill: 0    4. Encounter for screening for malignant neoplasm of colon  - Ambulatory Referral For Screening Colonoscopy       Counseling was given to patient for the following topics: instructions for management.    Follow Up  Return in about 3 weeks (around 2/7/2024) for Follow Up.    Aguilar  [facility]  [address] [address] [phone]

## 2024-01-19 ENCOUNTER — OUTSIDE FACILITY SERVICE (OUTPATIENT)
Dept: GASTROENTEROLOGY | Facility: CLINIC | Age: 69
End: 2024-01-19
Payer: COMMERCIAL

## 2024-01-19 ENCOUNTER — PATIENT ROUNDING (BHMG ONLY) (OUTPATIENT)
Age: 69
End: 2024-01-19
Payer: COMMERCIAL

## 2024-01-19 ENCOUNTER — LAB REQUISITION (OUTPATIENT)
Dept: LAB | Facility: HOSPITAL | Age: 69
End: 2024-01-19
Payer: COMMERCIAL

## 2024-01-19 DIAGNOSIS — K44.9 DIAPHRAGMATIC HERNIA WITHOUT OBSTRUCTION OR GANGRENE: ICD-10-CM

## 2024-01-19 DIAGNOSIS — K64.8 OTHER HEMORRHOIDS: ICD-10-CM

## 2024-01-19 DIAGNOSIS — K31.89 OTHER DISEASES OF STOMACH AND DUODENUM: ICD-10-CM

## 2024-01-19 DIAGNOSIS — Z12.11 ENCOUNTER FOR SCREENING FOR MALIGNANT NEOPLASM OF COLON: ICD-10-CM

## 2024-01-19 DIAGNOSIS — K22.89 OTHER SPECIFIED DISEASE OF ESOPHAGUS: ICD-10-CM

## 2024-01-19 DIAGNOSIS — K22.2 ESOPHAGEAL OBSTRUCTION: ICD-10-CM

## 2024-01-19 DIAGNOSIS — D12.5 BENIGN NEOPLASM OF SIGMOID COLON: ICD-10-CM

## 2024-01-19 DIAGNOSIS — R19.4 CHANGE IN BOWEL HABIT: ICD-10-CM

## 2024-01-19 DIAGNOSIS — N28.89 RENAL MASS: Primary | ICD-10-CM

## 2024-01-19 PROCEDURE — 45385 COLONOSCOPY W/LESION REMOVAL: CPT | Performed by: INTERNAL MEDICINE

## 2024-01-19 PROCEDURE — 45380 COLONOSCOPY AND BIOPSY: CPT | Performed by: INTERNAL MEDICINE

## 2024-01-19 PROCEDURE — 88305 TISSUE EXAM BY PATHOLOGIST: CPT | Performed by: INTERNAL MEDICINE

## 2024-01-19 NOTE — PROGRESS NOTES
My name is Tressa and I am the manager of the [facility] [place] office.  I wanted to take a minute and say hello and welcome you to the practice.      I hope you had a good experience with the office.  If you have any questions or concerns please let me know.    Thank you for choosing [facility] and we look forward to taking care of your health needs.     Tressa Saha  Practice Manager

## 2024-01-22 LAB — REF LAB TEST METHOD: NORMAL

## 2024-01-24 ENCOUNTER — TELEPHONE (OUTPATIENT)
Age: 69
End: 2024-01-24
Payer: COMMERCIAL

## 2024-01-24 NOTE — TELEPHONE ENCOUNTER
Patient is scheduled to have a kidney CT and the ordering provider, Dr Samson, was concerned about the dye being used since the patient has a fish allergy. She wanted clarification if it would be ok to still get the CT. Please give her a call back [phone]

## 2024-01-24 NOTE — TELEPHONE ENCOUNTER
Called and discussed with patient. She is still okay to get the contrasted ct, she will just be at slightly higher risk of allergic reaction. Counseled her on symptoms to watch for.

## 2024-02-01 ENCOUNTER — OFFICE VISIT (OUTPATIENT)
Age: 69
End: 2024-02-01
Payer: COMMERCIAL

## 2024-02-01 VITALS
BODY MASS INDEX: 21.33 KG/M2 | SYSTOLIC BLOOD PRESSURE: 124 MMHG | OXYGEN SATURATION: 98 % | HEART RATE: 94 BPM | DIASTOLIC BLOOD PRESSURE: 78 MMHG | WEIGHT: 113 LBS

## 2024-02-01 DIAGNOSIS — M54.12 CERVICAL RADICULOPATHY: Primary | ICD-10-CM

## 2024-02-01 DIAGNOSIS — M54.14 THORACIC RADICULOPATHY: ICD-10-CM

## 2024-02-01 DIAGNOSIS — R10.13 EPIGASTRIC PAIN: ICD-10-CM

## 2024-02-01 PROCEDURE — 99214 OFFICE O/P EST MOD 30 MIN: CPT | Performed by: STUDENT IN AN ORGANIZED HEALTH CARE EDUCATION/TRAINING PROGRAM

## 2024-02-01 RX ORDER — DULOXETIN HYDROCHLORIDE 30 MG/1
30 CAPSULE, DELAYED RELEASE ORAL DAILY
Qty: 30 CAPSULE | Refills: 5 | Status: SHIPPED | OUTPATIENT
Start: 2024-02-01

## 2024-02-01 NOTE — PROGRESS NOTES
Office Note     Name: Kelsie Wilson    : 1955     MRN: [id number]     Chief Complaint  Back Pain (On and off, severe on tuesday), Leg Pain (Tingling past couple weeks), and Hoarse (Denies pain, throat just feels weak and hard to talk past couple weeks)    Subjective     History of Present Illness:  Kelsie Wilson is a 69 y.o. female who presents today for follow up of back pain. This was thought to be due to pill esophagitis at last visit with symptoms mainly revolving around eating and recent bactrim prescription.  She has since had an EGD, which did not show pill esophagitis, but did show some peptic duodenitis which was H pylori negative. Her symptoms have not improved on a PPI, tylenol, motrin, muscle relaxers, or steroids.  She has more back and rib pain at this time, but still has a decreased appetite and associates symptoms with food, especially fatty food.      Past Medical History:   Past Medical History:   Diagnosis Date    Anxiety about health 2021    Contact lens/glasses fitting     Endometrial cancer     Heartburn     Kidney infection     Post-menopausal bleeding        Past Surgical History:   Past Surgical History:   Procedure Laterality Date     SECTION      TOTAL LAPAROSCOPIC HYSTERECTOMY SALPINGO OOPHORECTOMY N/A 2021    Procedure: TOTAL LAPAROSCOPIC HYSTERECTOMY BILATERAL SALPINGOOPHORECTOMY WITH DAVINCI ROBOT, SENTINEL LYMPH NODE DISSECTION;  Surgeon: Kell Monaco MD;  Location: [facility];  Service: [facility];  Laterality: N/A;    TUBAL ABDOMINAL LIGATION         Immunizations:   Immunization History   Administered Date(s) Administered    COVID-19 (MODERNA) 1st,2nd,3rd Dose Monovalent 2021, 2021    Hepatitis A 2018        Medications:     Current Outpatient Medications:     pantoprazole (PROTONIX) 40 MG EC tablet, Take 1 tablet by mouth Every Morning Before Breakfast., Disp: 30 tablet, Rfl: 0    DULoxetine (CYMBALTA) 30 MG capsule, Take  1 capsule by mouth Daily., Disp: 30 capsule, Rfl: 5    ketorolac (TORADOL) 10 MG tablet, Take 1 tablet by mouth Every 6 (Six) Hours As Needed. (Patient not taking: Reported on 1/17/2024), Disp: , Rfl:     methocarbamol (ROBAXIN) 500 MG tablet, Take 1 tablet by mouth 3 (Three) Times a Day. (Patient not taking: Reported on 1/17/2024), Disp: , Rfl:     Sod Picosulfate-Mag Ox-Cit Acd (Clenpiq) 10-3.5-12 MG-GM -GM/160ML solution, Take 350 mL by mouth Take As Directed. (Patient not taking: Reported on 2/1/2024), Disp: 350 mL, Rfl: 0    sucralfate (Carafate) 1 g tablet, Take 1 tablet by mouth 3 (Three) Times a Day As Needed (Abdominal/back pain). (Patient not taking: Reported on 2/1/2024), Disp: 60 tablet, Rfl: 3    triamcinolone (KENALOG) 0.1 % ointment, Apply 1 application topically to the appropriate area as directed 2 (Two) Times a Day. (Patient taking differently: Apply 1 application  topically to the appropriate area as directed As Needed.), Disp: 30 g, Rfl: 3    Allergies:   Allergies   Allergen Reactions    Codeine GI Intolerance     Other reaction(s): GI Intolerance    Sulfamethoxazole-Trimethoprim Nausea And Vomiting    Fish Allergy GI Intolerance     Other reaction(s): GI Intolerance    Fish-Derived Products GI Intolerance       Family History:   Family History   Problem Relation Age of Onset    Pancreatic cancer Mother     Hypertension Mother     Parkinsonism Father     Hypertension Father        Social History:   Social History     Socioeconomic History    Marital status:    Tobacco Use    Smoking status: Never    Smokeless tobacco: Never   Vaping Use    Vaping Use: Never used   Substance and Sexual Activity    Alcohol use: Never    Drug use: Never    Sexual activity: Not Currently     Birth control/protection: None         Objective     Vital Signs  /78   Pulse 94   Wt 51.3 kg (113 lb)   SpO2 98%   BMI 21.33 kg/m²   Estimated body mass index is 21.33 kg/m² as calculated from the following:     "Height as of 1/17/24: 155 cm (61.02\").    Weight as of this encounter: 51.3 kg (113 lb).    BMI is within normal parameters. No other follow-up for BMI required.      Physical Exam  Constitutional:       General: She is not in acute distress.     Appearance: She is not toxic-appearing.   Cardiovascular:      Rate and Rhythm: Normal rate and regular rhythm.      Heart sounds: No murmur heard.     No friction rub. No gallop.   Pulmonary:      Effort: Pulmonary effort is normal.      Breath sounds: Normal breath sounds.   Abdominal:      General: Abdomen is flat. There is no distension.   Skin:     General: Skin is warm and dry.   Neurological:      Mental Status: She is alert.   Psychiatric:         Mood and Affect: Mood normal.         Behavior: Behavior normal.          Assessment and Plan     1. Cervical radiculopathy  Uncontrolled. Unclear etiology at this time, x rays are negative and she has failed 6 weeks of conservative management  -Will trial duloxetine as she does not want any narcotics or overly sedating medications  -Order MRI to assess for spinal stenosis/nerve impingement  - DULoxetine (CYMBALTA) 30 MG capsule; Take 1 capsule by mouth Daily.  Dispense: 30 capsule; Refill: 5  - MRI Cervical Spine Without Contrast; Future    2. Thoracic radiculopathy  -uncontrolled, unclear etiology  -Will evaluate further with MRI thoracic spine as symptoms are more focused on C and T spine with some radicular symptoms  - MRI Thoracic Spine Without Contrast; Future    3. Epigastric pain  Unclear etiology, fatty foods are a significant source of pain and she has been unable to eat. Will order MRCP as she has had negative ct scan and EGD  - MRI abdomen w contrast mrcp; Future       Counseling was given to patient for the following topics: instructions for management.    Follow Up  Return in about 3 weeks (around 2/22/2024).    MD [facility]  [address] " [address] [address] [phone]

## 2024-03-03 ENCOUNTER — HOSPITAL ENCOUNTER (OUTPATIENT)
Dept: MRI IMAGING | Facility: HOSPITAL | Age: 69
Discharge: HOME | End: 2024-03-03
Payer: COMMERCIAL

## 2024-03-03 ENCOUNTER — APPOINTMENT (OUTPATIENT)
Dept: MRI IMAGING | Facility: HOSPITAL | Age: 69
End: 2024-03-03
Payer: COMMERCIAL

## 2024-03-03 DIAGNOSIS — M54.12 CERVICAL RADICULOPATHY: ICD-10-CM

## 2024-03-03 DIAGNOSIS — M54.14 THORACIC RADICULOPATHY: ICD-10-CM

## 2024-03-03 DIAGNOSIS — R10.13 EPIGASTRIC PAIN: ICD-10-CM

## 2024-03-03 PROCEDURE — 0 GADOBENATE DIMEGLUMINE 529 MG/ML SOLUTION: Performed by: STUDENT IN AN ORGANIZED HEALTH CARE EDUCATION/TRAINING PROGRAM

## 2024-03-03 PROCEDURE — 72141 MRI NECK SPINE W/O DYE: CPT

## 2024-03-03 PROCEDURE — 74183 MRI ABD W/O CNTR FLWD CNTR: CPT

## 2024-03-03 PROCEDURE — 72146 MRI CHEST SPINE W/O DYE: CPT

## 2024-03-03 PROCEDURE — A9577 INJ MULTIHANCE: HCPCS | Performed by: STUDENT IN AN ORGANIZED HEALTH CARE EDUCATION/TRAINING PROGRAM

## 2024-03-03 RX ADMIN — GADOBENATE DIMEGLUMINE 10 ML: 529 INJECTION, SOLUTION INTRAVENOUS at 09:39

## 2024-03-17 LAB — CREAT BLDA-MCNC: 0.6 MG/DL (ref 0.6–1.3)

## 2024-04-09 ENCOUNTER — OFFICE VISIT (OUTPATIENT)
Dept: GYNECOLOGIC ONCOLOGY | Facility: CLINIC | Age: 69
End: 2024-04-09
Payer: COMMERCIAL

## 2024-04-09 VITALS
DIASTOLIC BLOOD PRESSURE: 74 MMHG | HEART RATE: 76 BPM | SYSTOLIC BLOOD PRESSURE: 165 MMHG | RESPIRATION RATE: 18 BRPM | BODY MASS INDEX: 23.11 KG/M2 | OXYGEN SATURATION: 100 % | HEIGHT: 61 IN | TEMPERATURE: 97.4 F | WEIGHT: 122.4 LBS

## 2024-04-09 DIAGNOSIS — Z85.42 HISTORY OF ENDOMETRIAL CANCER: Primary | ICD-10-CM

## 2024-04-09 DIAGNOSIS — N90.4 LICHEN SCLEROSUS OF FEMALE GENITALIA: ICD-10-CM

## 2024-04-09 DIAGNOSIS — R03.0 ELEVATED BLOOD PRESSURE READING IN OFFICE WITHOUT DIAGNOSIS OF HYPERTENSION: ICD-10-CM

## 2024-04-09 DIAGNOSIS — N89.8 VAGINAL DISCHARGE: ICD-10-CM

## 2024-04-09 PROCEDURE — 99214 OFFICE O/P EST MOD 30 MIN: CPT | Performed by: NURSE PRACTITIONER

## 2024-04-09 RX ORDER — TRIAMCINOLONE ACETONIDE 1 MG/G
1 OINTMENT TOPICAL 2 TIMES DAILY PRN
Qty: 30 G | Refills: 3 | Status: SHIPPED | OUTPATIENT
Start: 2024-04-09

## 2024-04-09 NOTE — PROGRESS NOTES
GYN ONCOLOGY CANCER SURVEILLANCE FOLLOW-UP    Kelsie Wilson  [id number]  1955    Subjective   Chief Complaint: follow up, h/o uterine cancer        History of present illness:   Kelsie Wilson is a 69 y.o. year old female who is here today for ongoing surveillance of Endometrial Cancer, see Cancer History.  She is now over 3 years out from completion of treatment with surgery alone. We also follow her vulvar lichen sclerosus, well controlled with triamcinolone. RF will be sent to preferred pharmacy today. She denies any recent flares. She reports having a recurring bump on her left labia that comes/ goes without any intervention/ treatment. Not present currently, but it is red and tender when it does occur. States this is different than her typical lichen sclerosis lesions which are white patches. Otherwise, she is feeling very well and has no complaints or concerns at this time. Specifically, she denies any vaginal bleeding, vaginal discharge, pelvic pain, and changes in bowel or bladder function.  She is now following with Dr. Inocencio Wiley of urology for small mass associated with atrophic left kidney-- due for repeat imaging in 12/2024.  Reports not particularly pleased with recent visit, and inquires any urology recommendations as she wishes to transfer her care.  Her BP was noted to be elevated during visit.  No PMH of HTN. Not on any BP meds. Asymptomatic. Denies anxiety r/t today's apt. She does have an automated cuff at home and regularly follows with her PCP.          Cancer History:   Oncology/Hematology History   Endometrial cancer   1/8/2021 Initial Diagnosis    Patient presented to Dr. Berger with c/o postmenopausal spotting x 1 month. Ultrasound showed thickened endometrium. Endometrial biopsy revealed grade 1 endometrioid adenocarcinoma. Referred to Gyn Oncology     1/14/2021 Surgery    RTLH/BSO and sentinel lymph node dissection by Dr. Kell Monaco at [facility].    Final pathology showed 1 cm  "endometrioid tumor, non-invasive into the myometrium. Nodes and all other structures negative. MMR showed absent MLH1 and PMS2, reflex hypermethylation promoter testing positive = sporadic tumor, no further testing needed.   Stage IA grade 1        9/13/2021 Survivorship    Survivorship Care Plan completed and discussed with patient.  Copy of Survivorship Care Plan provided to patient and primary care provider.           The current medication list and allergy list were reviewed and reconciled.     Past Medical History, Past Surgical History, Social History, Family History have been reviewed and are without significant changes except as mentioned.      Review of Systems   Constitutional: Negative.    Respiratory: Negative.     Cardiovascular: Negative.    Gastrointestinal: Negative.    Genitourinary: Negative.    Skin: Negative.            Objective   Physical Exam  Vital Signs: /74   Pulse 76   Temp 97.4 °F (36.3 °C) (Temporal)   Resp 18   Ht 155 cm (61.02\")   Wt 55.5 kg (122 lb 6.4 oz)   SpO2 100%   BMI 23.11 kg/m²   Vitals:    04/09/24 1007   PainSc:   2   PainLoc: Back  Comment: thora and lumbar           General Appearance:  alert, cooperative, no apparent distress, appears stated age, and normal weight   Neurologic/Psych: A&O x 3, gait steady, appropriate affect   HEENT:  Normocephalic, without obvious abnormality, mucous membranes moist   Abdomen:   Soft, non-tender, non-distended, and no organomegaly   Lymph nodes: No cervical, supraclavicular, inguinal adenopathy noted   Pelvic: External Genitalia  atrophic, without lesions. No skin changes or lesions appreciated on vulva. There are varicosities (with spider vein appearance) noted on bilateral labia minora.   Vagina  is pale, atrophic. Small amount of dark yellow discharge noted in vaginal vault.   Vaginal Cuff  Female Vaginal Cuff: smooth, intact, and without visible lesions  Uterus  surgically absent  Ovaries  surgically absent bilaterally " "and without palpable masses or fullness  Parametria  smooth     ECOG score: 0             Result Review :    -abd MRI 3-3-24  -CT abd/pelvis 12-6-23    Tumor marker:  No results found for: \"\"    PHQ-9 Total Score:      Procedure Note:            Assessment and Plan:    Diagnoses and all orders for this visit:    1. History of endometrial cancer (Primary)  -There is no evidence of disease upon today's exam. She is now over 3 years out from completion of treatment with surgery alone. She may go to yearly surveillance visits. She is understanding to call with any changes in pelvic symptoms or general GYN concerns at any time between regularly scheduled visits.   -Next time the red, tender bump recurs on left labia she will either return to office or send me a picture. I particularly would want to see her in  the office to examine if this is occurring more frequently, increased severity, or does not resolve.   -Highly recommended Dr. Yang for urology care. Also could consider either Dr Blackburn or Dr Chavez.  -Pain assessment was performed today as a part of patient’s care.  For patients with pain related to surgery, gynecologic malignancy or cancer treatment, the plan is as noted in the assessment/plan.  For patients with pain not related to these issues, they are to seek any further needed care from a more appropriate provider, such as PCP.    2. Lichen sclerosus of female genitalia  -Well controlled, continue current tx. PRN steroid ointment RF sent to pharmacy.   -     triamcinolone (KENALOG) 0.1 % ointment; Apply 1 Application topically to the appropriate area as directed 2 (Two) Times a Day As Needed for Irritation.  Dispense: 30 g; Refill: 3    3. Vaginal discharge  -Vaginal nuswab obtained during pelvic exam. I will be in touch with results once available and any additional recommendations at that time.   -     NuSwab VG+ - Swab, Vagina; Future  -     NuSwab VG+ - Swab, Vagina    4. Elevated blood " pressure reading in office without diagnosis of hypertension  -Discussed rechecking BP during visit today, but forgot to do so before patient walked out the door.  When I contact her regarding vaginal swab results, I will also plan to recommend that she keep an eye on blood pressure at home for the next few days.  If consistently >130/80 she should schedule a designated appointment with PCP.    Level of service justified based on 30 minutes spent in patient care on this date of service including, but not limited to: preparing to see the patient, obtaining and/or reviewing history, performing medically appropriate examination, ordering tests/medicine/procedures, independently interpreting results, documenting clinical information in EHR, and counseling/education of patient/family/caregiver.     Follow-up:   Return to clinic in 1 year for ongoing cancer surveillance.      Electronically signed by PATEL Block on 04/09/2024

## 2024-04-10 ENCOUNTER — TELEPHONE (OUTPATIENT)
Dept: GYNECOLOGIC ONCOLOGY | Facility: CLINIC | Age: 69
End: 2024-04-10
Payer: COMMERCIAL

## 2024-04-10 NOTE — TELEPHONE ENCOUNTER
Please call patient regarding Nu-swab.  Unfortunately, the lab was unable to run the test on the swab from yesterday. The order has been canceled. I had only ordered it due to vaginal discharge noted during pelvic exam, but as patient is completely asymptomatic and doing well it is not imperative that she return to recollect. Please keep us informed of any new symptoms or concerns before next scheduled apt. Also, we forgot to recheck her BP before she left yesterday.  I recommend Kelsie monitor her blood pressure and heart rate at home for the next several days.  If consistently >130/80 she should schedule a designated appointment with PCP.

## 2024-04-11 ENCOUNTER — TELEPHONE (OUTPATIENT)
Dept: GYNECOLOGIC ONCOLOGY | Facility: CLINIC | Age: 69
End: 2024-04-11
Payer: COMMERCIAL

## 2024-04-11 NOTE — TELEPHONE ENCOUNTER
RN called patient and reported the error with the nu swab. Patient was understanding and did not seem concerned.Patient was told to let us know if she has concerning symptoms and would like to come in for a re-swab.  Patient was also asked to monitor her BP at home as it had been high in the office. Patient was asked to report consistent findings of 130/90 to her PCP for possible medication needs. Patient verbalized understanding.

## 2025-04-14 NOTE — PROGRESS NOTES
GYN ONCOLOGY CANCER SURVEILLANCE FOLLOW-UP    Kelsie Wilson  [id number]  1955    Subjective   Chief Complaint: 1-year follow up, uterine cancer    History of present illness:   Kelsie Wilson is a 70 y.o. year old female who is here today for ongoing surveillance of Endometrial Cancer, see Cancer History. She is now 4 years out since completion of treatment with surgery alone and doing well.  She has no acute complaints or concerns at this time.  Denies any major changes in health since last visit.  She denies vaginal bleeding and discharge. She does not have abdominal or pelvic pain. She is tolerating a regular diet and endorses a normal appetite. She denies nausea and vomiting. She denies early satiety and bloating. Denies any CP, SOB, lightheadedness or dizziness. She denies changes in her bowel/bladder. No dysuria, frequency, urgency, hematuria or flank pain. Reports normal energy levels.     We also follow her vulvar lichen sclerosus, well controlled with triamcinolone. She denies any recent flares. Does request RF of topical steroid today.     She has known h/o pelvic adhesions. As above, no gyn complaints at this time.       Notable PMH of left kidney. Follows with Dr Wiley. Last visit at pts request I provided names of a few [facility]ist (and 1 [facility]) that I recommend.  She did not recall this conversation today.  CT prev demonstrated noncystic left renal lesion on 12-6-2023.  Subsequent MRI of the abdomen/MRCP completed on 3-3-2024.  Previous left renal lesion not appreciated on this MRI although radiology report states possibly obscured by mild motion artifact. There is no acute MRI findings.  Chronic asymmetrical left renal atrophy noted.  Described as possibly congenital or sequela of prior infectious/inflammatory or vascular insult.  1.9 cm right adrenal nodule described as benign lipid rich right adrenal adenoma.  Imaging was done to workup epigastric pain with difficulty eating  x 3 months, history of uterine cancer.  She also underwent EGD and colonoscopy by Dr. Samson in 1/2024.  Colon biopsies showed a tubular adenoma type polyp.  Upper endoscopy biopsies unremarkable.  She was recommended to repeat colonoscopy in 5 years (due 1/2029).     Last visit BP was notably elevated. It is again above goal today.  Med rec updated during visit and per patient she is only routinely using her triamcinolone ointment.    PE is Dr. Mateus Nix. Last visit 2-1-24. No future apts scheduled per epic.   Urologist is Dr Wiley. Last seen 12-6-23. No future apts scheduled per epic.      Cancer History:   Oncology/Hematology History   Endometrial cancer   1/8/2021 Initial Diagnosis    Patient presented to Dr. Berger with c/o postmenopausal spotting x 1 month. Ultrasound showed thickened endometrium. Endometrial biopsy revealed grade 1 endometrioid adenocarcinoma. Referred to Gyn Oncology     1/14/2021 Surgery    RTLH/BSO and sentinel lymph node dissection by Dr. Kell Moncao at [facility].    Final pathology showed 1 cm endometrioid tumor, non-invasive into the myometrium. Nodes and all other structures negative. MMR showed absent MLH1 and PMS2, reflex hypermethylation promoter testing positive = sporadic tumor, no further testing needed.   Stage IA grade 1        9/13/2021 Survivorship    Survivorship Care Plan completed and discussed with patient.  Copy of Survivorship Care Plan provided to patient and primary care provider.           The current medication list and allergy list were reviewed and reconciled.     Past Medical History, Past Surgical History, Social History, Family History have been reviewed and are without significant changes except as mentioned.      Review of Systems   Constitutional: Negative.    Gastrointestinal: Negative.    Genitourinary: Negative.    Psychiatric/Behavioral: Negative.             Objective   Physical Exam  Vital Signs: /71   Pulse 74   Temp 97.6 °F (36.4 °C)  "(Temporal)   Resp 16   Ht 154.9 cm (61\")   Wt 58.3 kg (128 lb 9.6 oz)   SpO2 98%   BMI 24.30 kg/m²   Vitals:    04/15/25 1018   PainSc: 0-No pain           General Appearance:  alert, cooperative, no apparent distress, appears stated age, and normal weight   Neurologic/Psych: A&O x 3, gait steady, appropriate affect   HEENT:  Normocephalic, without obvious abnormality, mucous membranes moist   Abdomen:   Soft, non-tender, non-distended, and no organomegaly   Lymph nodes: No cervical, supraclavicular, inguinal adenopathy noted   Pelvic: External Genitalia  atrophic, with tissues and labial fusion consistent with lichen sclerosus. No lesions  Vagina  is pale, atrophic.   Vaginal Cuff  Female Vaginal Cuff: smooth, intact and without visible lesions  Uterus  surgically absent and no palpable masses  Ovaries  surgically absent bilaterally  Parametria  smooth  Rectovaginal exam deferred     ECOG score: 0             Result Review :  Last imaging study was   - Completed MRI of the abdomen 3-3-2024   -CT abdomen and pelvis on 12-6/2023  Tumor marker:  No results found for: \"\"    PHQ-9 Total Score:      Procedure Note:            Assessment and Plan:     -Kelsie Wilson is a 70 y.o. with a history of a stage stage 1A grade 1 endometrial cancer s/p RTLH/BSO by Dr Monaco (treatment completed in 1/2021).  She is currently without clinical evidence of disease. Signs of recurrent disease, such as vaginal bleeding, persistent abdominopelvic pain, urinary or bowel changes, and shortness of breath were reviewed.  She was advised to follow up immediately if she develops any of the above symptoms.  She is understanding to call with any changes in pelvic symptoms or general GYN concerns at any time between regularly scheduled visits.   -She will follow-up in 1 year.  If all is well between now and that time we will potentially plan to graduate patient from our services during this visit.  Will plan to  more at " this time on need for continued once yearly pelvic exams moving forward.  She was initially referred to us by Fiona Berger CNM but has not seen her since initial cancer diagnosis. I did mention plan to potentially graduate at next visit during apt today.     Health maintenance: She was reminded to maintain a healthy lifestyle with a well balanced diet, calcium and vitamin D for osteoporosis prevention, and exercise as well as continue with recommended health and cancer screening guidelines.      -LS currently well controlled. Continue topical triamcinolone as prev prescribed. Refill sent.    -Encouraged pt to f/u with PCP    Diagnoses and all orders for this visit:    1. History of endometrial cancer (Primary)    2. Lichen sclerosus of female genitalia  -     triamcinolone (KENALOG) 0.1 % ointment; Apply 1 Application topically to the appropriate area as directed 2 (Two) Times a Day As Needed for Irritation.  Dispense: 80 g; Refill: 3      Pain assessment was performed today as a part of patient’s care.  For patients with pain related to surgery, gynecologic malignancy or cancer treatment, the plan is as noted in the assessment/plan.  For patients with pain not related to these issues, they are to seek any further needed care from a more appropriate provider, such as PCP.    Follow-up:     Return to clinic in 1 year for ongoing cancer surveillance.      Electronically signed by PATEL Block on 04/15/2025

## 2025-04-15 ENCOUNTER — OFFICE VISIT (OUTPATIENT)
Dept: GYNECOLOGIC ONCOLOGY | Facility: CLINIC | Age: 70
End: 2025-04-15
Payer: MEDICARE

## 2025-04-15 VITALS
HEIGHT: 61 IN | BODY MASS INDEX: 24.28 KG/M2 | WEIGHT: 128.6 LBS | DIASTOLIC BLOOD PRESSURE: 71 MMHG | TEMPERATURE: 97.6 F | HEART RATE: 74 BPM | OXYGEN SATURATION: 98 % | SYSTOLIC BLOOD PRESSURE: 156 MMHG | RESPIRATION RATE: 16 BRPM

## 2025-04-15 DIAGNOSIS — N90.4 LICHEN SCLEROSUS OF FEMALE GENITALIA: ICD-10-CM

## 2025-04-15 DIAGNOSIS — Z85.42 HISTORY OF ENDOMETRIAL CANCER: Primary | ICD-10-CM

## 2025-04-15 RX ORDER — TRIAMCINOLONE ACETONIDE 1 MG/G
1 OINTMENT TOPICAL 2 TIMES DAILY PRN
Qty: 80 G | Refills: 3 | Status: SHIPPED | OUTPATIENT
Start: 2025-04-15

## (undated) DEVICE — NDL BLNT 18G 1 1/2IN

## (undated) DEVICE — INTENDED FOR TISSUE SEPARATION, AND OTHER PROCEDURES THAT REQUIRE A SHARP SURGICAL BLADE TO PUNCTURE OR CUT.: Brand: BARD-PARKER ® STAINLESS STEEL BLADES

## (undated) DEVICE — CANNULA SEAL

## (undated) DEVICE — GLV SURG SENSICARE PI LF PF 6.0

## (undated) DEVICE — SYR LUERLOK 50ML

## (undated) DEVICE — TIP COVER ACCESSORY

## (undated) DEVICE — DRP ADAPT ALLY UTER POSTN SYS 1P/U

## (undated) DEVICE — APPL CHLORAPREP TINTED 26ML TEAL

## (undated) DEVICE — UNDERGLV SURG BIOGEL INDICAT PF 61/2 GRN

## (undated) DEVICE — PK MAJ GYN DAVINCI 10

## (undated) DEVICE — SHEET, DRAPE, SPLIT, STERILE: Brand: MEDLINE

## (undated) DEVICE — MANIP UTER RUMI 2 KOH EFFICIENT 2.5CM BL

## (undated) DEVICE — SUT MNCRYL PLS ANTIB UD 3/0 PS2 27IN

## (undated) DEVICE — CVR HNDL LIGHT RIGID

## (undated) DEVICE — COLUMN DRAPE

## (undated) DEVICE — SKIN AFFIX SURG ADHESIVE 72/CS 0.55ML: Brand: MEDLINE

## (undated) DEVICE — TISSUE RETRIEVAL SYSTEM: Brand: INZII RETRIEVAL SYSTEM

## (undated) DEVICE — MANIP UTER RUMI TP 5.1MM 6CM LAV

## (undated) DEVICE — ARM DRAPE

## (undated) DEVICE — BOWL UTIL STRL 32OZ

## (undated) DEVICE — ANTIBACTERIAL UNDYED BRAIDED (POLYGLACTIN 910), SYNTHETIC ABSORBABLE SURGICAL SUTURE: Brand: COATED VICRYL

## (undated) DEVICE — BLADELESS OBTURATOR: Brand: WECK VISTA

## (undated) DEVICE — SYR LUERLOK 5CC

## (undated) DEVICE — NEEDLE, QUINCKE 22GX3.5": Brand: MEDLINE INDUSTRIES, INC.

## (undated) DEVICE — PAD ARMBRD SURG CONVOL 7.5X20X2IN

## (undated) DEVICE — ANTIBACTERIAL UNDYED BRAIDED (POLYGLACTIN 910), SYNTHETIC ABSORBABLE SUTURE: Brand: COATED VICRYL

## (undated) DEVICE — ST TBG CONN PNEUMOCLEAR EVAC SMOKE HEAT/HUMID